# Patient Record
Sex: MALE | Race: OTHER | HISPANIC OR LATINO | Employment: FULL TIME | URBAN - METROPOLITAN AREA
[De-identification: names, ages, dates, MRNs, and addresses within clinical notes are randomized per-mention and may not be internally consistent; named-entity substitution may affect disease eponyms.]

---

## 2019-06-11 ENCOUNTER — HOSPITAL ENCOUNTER (EMERGENCY)
Facility: HOSPITAL | Age: 54
Discharge: HOME | End: 2019-06-11
Attending: EMERGENCY MEDICINE | Admitting: EMERGENCY MEDICINE
Payer: COMMERCIAL

## 2019-06-11 VITALS
WEIGHT: 179.23 LBS | RESPIRATION RATE: 14 BRPM | DIASTOLIC BLOOD PRESSURE: 84 MMHG | HEART RATE: 68 BPM | TEMPERATURE: 98.7 F | SYSTOLIC BLOOD PRESSURE: 135 MMHG | OXYGEN SATURATION: 97 %

## 2019-06-11 DIAGNOSIS — R10.13 EPIGASTRIC ABDOMINAL PAIN: Primary | ICD-10-CM

## 2019-06-11 LAB
ALBUMIN SERPL BCP-MCNC: 3.5 G/DL (ref 3.5–5)
ALP SERPL-CCNC: 65 U/L (ref 46–116)
ALT SERPL W P-5'-P-CCNC: 28 U/L (ref 12–78)
ANION GAP SERPL CALCULATED.3IONS-SCNC: 2 MMOL/L (ref 4–13)
AST SERPL W P-5'-P-CCNC: 18 U/L (ref 5–45)
ATRIAL RATE: 70 BPM
BASOPHILS # BLD AUTO: 0.05 THOUSANDS/ΜL (ref 0–0.1)
BASOPHILS NFR BLD AUTO: 1 % (ref 0–1)
BILIRUB SERPL-MCNC: 0.52 MG/DL (ref 0.2–1)
BUN SERPL-MCNC: 20 MG/DL (ref 5–25)
CALCIUM SERPL-MCNC: 8.6 MG/DL (ref 8.3–10.1)
CHLORIDE SERPL-SCNC: 106 MMOL/L (ref 100–108)
CO2 SERPL-SCNC: 28 MMOL/L (ref 21–32)
CREAT SERPL-MCNC: 1.2 MG/DL (ref 0.6–1.3)
EOSINOPHIL # BLD AUTO: 0.17 THOUSAND/ΜL (ref 0–0.61)
EOSINOPHIL NFR BLD AUTO: 3 % (ref 0–6)
ERYTHROCYTE [DISTWIDTH] IN BLOOD BY AUTOMATED COUNT: 12.4 % (ref 11.6–15.1)
GFR SERPL CREATININE-BSD FRML MDRD: 69 ML/MIN/1.73SQ M
GLUCOSE SERPL-MCNC: 93 MG/DL (ref 65–140)
HCT VFR BLD AUTO: 44.7 % (ref 36.5–49.3)
HGB BLD-MCNC: 15.3 G/DL (ref 12–17)
IMM GRANULOCYTES # BLD AUTO: 0.02 THOUSAND/UL (ref 0–0.2)
IMM GRANULOCYTES NFR BLD AUTO: 0 % (ref 0–2)
LIPASE SERPL-CCNC: 229 U/L (ref 73–393)
LYMPHOCYTES # BLD AUTO: 1.55 THOUSANDS/ΜL (ref 0.6–4.47)
LYMPHOCYTES NFR BLD AUTO: 25 % (ref 14–44)
MCH RBC QN AUTO: 32.6 PG (ref 26.8–34.3)
MCHC RBC AUTO-ENTMCNC: 34.2 G/DL (ref 31.4–37.4)
MCV RBC AUTO: 95 FL (ref 82–98)
MONOCYTES # BLD AUTO: 0.69 THOUSAND/ΜL (ref 0.17–1.22)
MONOCYTES NFR BLD AUTO: 11 % (ref 4–12)
NEUTROPHILS # BLD AUTO: 3.79 THOUSANDS/ΜL (ref 1.85–7.62)
NEUTS SEG NFR BLD AUTO: 60 % (ref 43–75)
NRBC BLD AUTO-RTO: 0 /100 WBCS
P AXIS: 70 DEGREES
PLATELET # BLD AUTO: 203 THOUSANDS/UL (ref 149–390)
PMV BLD AUTO: 10.6 FL (ref 8.9–12.7)
POTASSIUM SERPL-SCNC: 4.7 MMOL/L (ref 3.5–5.3)
PR INTERVAL: 126 MS
PROT SERPL-MCNC: 7.3 G/DL (ref 6.4–8.2)
QRS AXIS: 36 DEGREES
QRSD INTERVAL: 94 MS
QT INTERVAL: 378 MS
QTC INTERVAL: 408 MS
RBC # BLD AUTO: 4.7 MILLION/UL (ref 3.88–5.62)
SODIUM SERPL-SCNC: 136 MMOL/L (ref 136–145)
T WAVE AXIS: 49 DEGREES
TROPONIN I SERPL-MCNC: <0.02 NG/ML
VENTRICULAR RATE: 70 BPM
WBC # BLD AUTO: 6.27 THOUSAND/UL (ref 4.31–10.16)

## 2019-06-11 PROCEDURE — 84484 ASSAY OF TROPONIN QUANT: CPT | Performed by: EMERGENCY MEDICINE

## 2019-06-11 PROCEDURE — 99283 EMERGENCY DEPT VISIT LOW MDM: CPT | Performed by: EMERGENCY MEDICINE

## 2019-06-11 PROCEDURE — 80053 COMPREHEN METABOLIC PANEL: CPT | Performed by: EMERGENCY MEDICINE

## 2019-06-11 PROCEDURE — 93005 ELECTROCARDIOGRAM TRACING: CPT

## 2019-06-11 PROCEDURE — 93010 ELECTROCARDIOGRAM REPORT: CPT | Performed by: INTERNAL MEDICINE

## 2019-06-11 PROCEDURE — 83690 ASSAY OF LIPASE: CPT | Performed by: EMERGENCY MEDICINE

## 2019-06-11 PROCEDURE — 85025 COMPLETE CBC W/AUTO DIFF WBC: CPT | Performed by: EMERGENCY MEDICINE

## 2019-06-11 PROCEDURE — 36415 COLL VENOUS BLD VENIPUNCTURE: CPT | Performed by: EMERGENCY MEDICINE

## 2019-06-11 PROCEDURE — 99284 EMERGENCY DEPT VISIT MOD MDM: CPT

## 2019-06-11 RX ORDER — LIDOCAINE HYDROCHLORIDE 20 MG/ML
15 SOLUTION OROPHARYNGEAL ONCE
Status: COMPLETED | OUTPATIENT
Start: 2019-06-11 | End: 2019-06-11

## 2019-06-11 RX ORDER — MAGNESIUM HYDROXIDE/ALUMINUM HYDROXICE/SIMETHICONE 120; 1200; 1200 MG/30ML; MG/30ML; MG/30ML
30 SUSPENSION ORAL ONCE
Status: COMPLETED | OUTPATIENT
Start: 2019-06-11 | End: 2019-06-11

## 2019-06-11 RX ORDER — PANTOPRAZOLE SODIUM 40 MG/1
40 TABLET, DELAYED RELEASE ORAL
Status: DISCONTINUED | OUTPATIENT
Start: 2019-06-11 | End: 2019-06-11 | Stop reason: HOSPADM

## 2019-06-11 RX ORDER — OMEPRAZOLE 20 MG/1
20 CAPSULE, DELAYED RELEASE ORAL DAILY
Qty: 60 CAPSULE | Refills: 0 | Status: SHIPPED | OUTPATIENT
Start: 2019-06-11

## 2019-06-11 RX ADMIN — PANTOPRAZOLE SODIUM 40 MG: 40 TABLET, DELAYED RELEASE ORAL at 09:08

## 2019-06-11 RX ADMIN — LIDOCAINE HYDROCHLORIDE 15 ML: 20 SOLUTION ORAL; TOPICAL at 09:09

## 2019-06-11 RX ADMIN — ALUMINUM HYDROXIDE, MAGNESIUM HYDROXIDE, AND SIMETHICONE 30 ML: 200; 200; 20 SUSPENSION ORAL at 09:09

## 2019-10-07 ENCOUNTER — HOSPITAL ENCOUNTER (EMERGENCY)
Facility: HOSPITAL | Age: 54
Discharge: HOME | End: 2019-10-07
Attending: EMERGENCY MEDICINE | Admitting: EMERGENCY MEDICINE

## 2019-10-07 VITALS
DIASTOLIC BLOOD PRESSURE: 84 MMHG | HEART RATE: 84 BPM | OXYGEN SATURATION: 98 % | WEIGHT: 176 LBS | SYSTOLIC BLOOD PRESSURE: 144 MMHG | RESPIRATION RATE: 17 BRPM | TEMPERATURE: 97.9 F

## 2019-10-07 DIAGNOSIS — M95.8 ACQUIRED ABDOMINAL WALL DEFECT: Primary | ICD-10-CM

## 2019-10-07 LAB
ALBUMIN SERPL BCP-MCNC: 3.7 G/DL (ref 3.5–5)
ALP SERPL-CCNC: 66 U/L (ref 46–116)
ALT SERPL W P-5'-P-CCNC: 35 U/L (ref 12–78)
ANION GAP SERPL CALCULATED.3IONS-SCNC: 5 MMOL/L (ref 4–13)
AST SERPL W P-5'-P-CCNC: 25 U/L (ref 5–45)
ATRIAL RATE: 89 BPM
BASOPHILS # BLD AUTO: 0.06 THOUSANDS/ΜL (ref 0–0.1)
BASOPHILS NFR BLD AUTO: 1 % (ref 0–1)
BILIRUB SERPL-MCNC: 0.67 MG/DL (ref 0.2–1)
BUN SERPL-MCNC: 21 MG/DL (ref 5–25)
CALCIUM SERPL-MCNC: 9.2 MG/DL (ref 8.3–10.1)
CHLORIDE SERPL-SCNC: 105 MMOL/L (ref 100–108)
CO2 SERPL-SCNC: 26 MMOL/L (ref 21–32)
CREAT SERPL-MCNC: 1.3 MG/DL (ref 0.6–1.3)
EOSINOPHIL # BLD AUTO: 0.23 THOUSAND/ΜL (ref 0–0.61)
EOSINOPHIL NFR BLD AUTO: 3 % (ref 0–6)
ERYTHROCYTE [DISTWIDTH] IN BLOOD BY AUTOMATED COUNT: 12.7 % (ref 11.6–15.1)
GFR SERPL CREATININE-BSD FRML MDRD: 63 ML/MIN/1.73SQ M
GLUCOSE SERPL-MCNC: 106 MG/DL (ref 65–140)
HCT VFR BLD AUTO: 47.1 % (ref 36.5–49.3)
HGB BLD-MCNC: 16.3 G/DL (ref 12–17)
IMM GRANULOCYTES # BLD AUTO: 0.01 THOUSAND/UL (ref 0–0.2)
IMM GRANULOCYTES NFR BLD AUTO: 0 % (ref 0–2)
LYMPHOCYTES # BLD AUTO: 2.01 THOUSANDS/ΜL (ref 0.6–4.47)
LYMPHOCYTES NFR BLD AUTO: 29 % (ref 14–44)
MCH RBC QN AUTO: 31.8 PG (ref 26.8–34.3)
MCHC RBC AUTO-ENTMCNC: 34.6 G/DL (ref 31.4–37.4)
MCV RBC AUTO: 92 FL (ref 82–98)
MONOCYTES # BLD AUTO: 0.62 THOUSAND/ΜL (ref 0.17–1.22)
MONOCYTES NFR BLD AUTO: 9 % (ref 4–12)
NEUTROPHILS # BLD AUTO: 3.9 THOUSANDS/ΜL (ref 1.85–7.62)
NEUTS SEG NFR BLD AUTO: 58 % (ref 43–75)
NRBC BLD AUTO-RTO: 0 /100 WBCS
P AXIS: 68 DEGREES
PLATELET # BLD AUTO: 227 THOUSANDS/UL (ref 149–390)
PMV BLD AUTO: 10.4 FL (ref 8.9–12.7)
POTASSIUM SERPL-SCNC: 3.5 MMOL/L (ref 3.5–5.3)
PR INTERVAL: 134 MS
PROT SERPL-MCNC: 8.1 G/DL (ref 6.4–8.2)
QRS AXIS: 30 DEGREES
QRSD INTERVAL: 94 MS
QT INTERVAL: 358 MS
QTC INTERVAL: 435 MS
RBC # BLD AUTO: 5.13 MILLION/UL (ref 3.88–5.62)
SODIUM SERPL-SCNC: 136 MMOL/L (ref 136–145)
T WAVE AXIS: 85 DEGREES
TROPONIN I SERPL-MCNC: 0.02 NG/ML
VENTRICULAR RATE: 89 BPM
WBC # BLD AUTO: 6.83 THOUSAND/UL (ref 4.31–10.16)

## 2019-10-07 PROCEDURE — 99285 EMERGENCY DEPT VISIT HI MDM: CPT

## 2019-10-07 PROCEDURE — 36415 COLL VENOUS BLD VENIPUNCTURE: CPT | Performed by: EMERGENCY MEDICINE

## 2019-10-07 PROCEDURE — 93010 ELECTROCARDIOGRAM REPORT: CPT | Performed by: INTERNAL MEDICINE

## 2019-10-07 PROCEDURE — 93005 ELECTROCARDIOGRAM TRACING: CPT

## 2019-10-07 PROCEDURE — 84484 ASSAY OF TROPONIN QUANT: CPT | Performed by: EMERGENCY MEDICINE

## 2019-10-07 PROCEDURE — 85025 COMPLETE CBC W/AUTO DIFF WBC: CPT | Performed by: EMERGENCY MEDICINE

## 2019-10-07 PROCEDURE — 80053 COMPREHEN METABOLIC PANEL: CPT | Performed by: EMERGENCY MEDICINE

## 2019-10-07 PROCEDURE — 99284 EMERGENCY DEPT VISIT MOD MDM: CPT | Performed by: EMERGENCY MEDICINE

## 2019-10-07 RX ORDER — IBUPROFEN 600 MG/1
600 TABLET ORAL ONCE
Status: COMPLETED | OUTPATIENT
Start: 2019-10-07 | End: 2019-10-07

## 2019-10-07 RX ADMIN — IBUPROFEN 600 MG: 600 TABLET, FILM COATED ORAL at 07:35

## 2019-10-07 NOTE — ED PROVIDER NOTES
History  Chief Complaint   Patient presents with    Chest Pain     pt reports chest pain for several months, c/o it comes on when he eats or lifts something heavy     Bernarda Martinez is a 47y o  year old Male with PMH of GERD presenting to the [facility] ED for abdominal pain  Patient states three months of daily abdominal pain  Pain is localized to left upper quadrant  Patient notes he feels a small "click" in the area and the pain is reproduced with palpations  The pain is worse after meals and with heavy lifting  Does not radiate to the back  Patient has not had prior abdominal surgery  Taking omeprazole without relief  Denies N/V/D and no bloody stools or dark, tarry stools  Patient denies chest pain, dyspnea, lightheadedness  History provided by:  Patient   used: No    Abdominal Pain   Pain location:  LUQ  Pain quality: sharp    Pain radiates to:  Does not radiate  Pain severity:  Moderate  Onset quality:  Gradual  Duration:  12 weeks  Timing:  Constant  Progression:  Waxing and waning  Chronicity:  Recurrent  Context: eating    Context: not diet changes and not previous surgeries    Relieved by: Antacids  Associated symptoms: no anorexia, no chest pain, no chills, no constipation, no cough, no diarrhea, no dysuria, no fatigue, no fever, no hematochezia, no hematuria, no melena, no nausea, no shortness of breath and no vomiting    Risk factors: has not had multiple surgeries and no NSAID use        Prior to Admission Medications   Prescriptions Last Dose Informant Patient Reported? Taking?   omeprazole (PriLOSEC) 20 mg delayed release capsule 10/7/2019 at 0600  No Yes   Sig: Take 1 capsule (20 mg total) by mouth daily      Facility-Administered Medications: None       No past medical history on file  No past surgical history on file  No family history on file  I have reviewed and agree with the history as documented      Social History     Tobacco Use    Smoking status: Current Every Day Smoker     Types: Cigarettes    Smokeless tobacco: Never Used   Substance Use Topics    Alcohol use: Yes    Drug use: Never        Review of Systems   Constitutional: Negative for chills, fatigue and fever  HENT: Negative for congestion, nosebleeds and rhinorrhea  Eyes: Negative for visual disturbance  Respiratory: Negative for cough, chest tightness, shortness of breath and wheezing  Cardiovascular: Negative for chest pain, palpitations and leg swelling  Gastrointestinal: Positive for abdominal pain  Negative for abdominal distention, anal bleeding, anorexia, blood in stool, constipation, diarrhea, hematochezia, melena, nausea and vomiting  Endocrine: Negative for polyuria  Genitourinary: Negative for dysuria and hematuria  Musculoskeletal: Negative for arthralgias, gait problem and joint swelling  Skin: Negative for rash  Neurological: Negative for seizures, syncope, weakness, light-headedness and headaches  Hematological: Does not bruise/bleed easily  Psychiatric/Behavioral: Negative for behavioral problems and confusion  All other systems reviewed and are negative  Physical Exam  ED Triage Vitals   Temperature Pulse Respirations Blood Pressure SpO2   10/07/19 0706 10/07/19 0701 10/07/19 0701 10/07/19 0701 10/07/19 0701   97 9 °F (36 6 °C) 92 20 163/93 98 %      Temp Source Heart Rate Source Patient Position - Orthostatic VS BP Location FiO2 (%)   10/07/19 0706 10/07/19 0701 10/07/19 0701 10/07/19 0701 --   Oral Monitor Sitting Right arm       Pain Score       10/07/19 0701       8             Orthostatic Vital Signs  Vitals:    10/07/19 0701 10/07/19 0800   BP: 163/93 144/84   Pulse: 92 84   Patient Position - Orthostatic VS: Sitting Sitting       Physical Exam   Constitutional: He is oriented to person, place, and time  He appears well-developed and well-nourished  HENT:   Head: Normocephalic and atraumatic     Cardiovascular: Normal rate and regular rhythm  No murmur heard  Pulmonary/Chest: Effort normal and breath sounds normal  No respiratory distress  He has no wheezes  He has no rales  Abdominal: Soft  Bowel sounds are normal  He exhibits no distension  There is tenderness (Left upper quadrant, 6cm below left nipple  )  There is no rigidity, no rebound, no guarding, no CVA tenderness, no tenderness at McBurney's point and negative Nathan's sign  Focal abdominal wall defect with reproducible tenderness and reducible ventral hernia left upper quadrant  Overlying skin is not warm, erythematous   Neurological: He is alert and oriented to person, place, and time  Skin: Skin is warm  Capillary refill takes less than 2 seconds  Psychiatric: He has a normal mood and affect  Nursing note and vitals reviewed        ED Medications  Medications   ibuprofen (MOTRIN) tablet 600 mg (600 mg Oral Given 10/7/19 0735)       Diagnostic Studies  Results Reviewed     Procedure Component Value Units Date/Time    Comprehensive metabolic panel [[id number]] Collected:  10/07/19 0705    Lab Status:  Final result Specimen:  Blood from Arm, Right Updated:  10/07/19 0738     Sodium 136 mmol/L      Potassium 3 5 mmol/L      Chloride 105 mmol/L      CO2 26 mmol/L      ANION GAP 5 mmol/L      BUN 21 mg/dL      Creatinine 1 30 mg/dL      Glucose 106 mg/dL      Calcium 9 2 mg/dL      AST 25 U/L      ALT 35 U/L      Alkaline Phosphatase 66 U/L      Total Protein 8 1 g/dL      Albumin 3 7 g/dL      Total Bilirubin 0 67 mg/dL      eGFR 63 ml/min/1 73sq m     Narrative:       [place] guidelines for Chronic Kidney Disease (CKD):     Stage 1 with normal or high GFR (GFR > 90 mL/min/1 73 square meters)    Stage 2 Mild CKD (GFR = 60-89 mL/min/1 73 square meters)    Stage 3A Moderate CKD (GFR = 45-59 mL/min/1 73 square meters)    Stage 3B Moderate CKD (GFR = 30-44 mL/min/1 73 square meters)    Stage 4 Severe CKD (GFR = 15-29 mL/min/1 73 square meters)    Stage 5 End Stage CKD (GFR <15 mL/min/1 73 square meters)  Note: GFR calculation is accurate only with a steady state creatinine    Troponin I [[id number]]  (Normal) Collected:  10/07/19 0705    Lab Status:  Final result Specimen:  Blood from Arm, Right Updated:  10/07/19 0737     Troponin I 0 02 ng/mL     CBC and differential [[id number]] Collected:  10/07/19 0705    Lab Status:  Final result Specimen:  Blood from Arm, Right Updated:  10/07/19 0729     WBC 6 83 Thousand/uL      RBC 5 13 Million/uL      Hemoglobin 16 3 g/dL      Hematocrit 47 1 %      MCV 92 fL      MCH 31 8 pg      MCHC 34 6 g/dL      RDW 12 7 %      MPV 10 4 fL      Platelets 835 Thousands/uL      nRBC 0 /100 WBCs      Neutrophils Relative 58 %      Immat GRANS % 0 %      Lymphocytes Relative 29 %      Monocytes Relative 9 %      Eosinophils Relative 3 %      Basophils Relative 1 %      Neutrophils Absolute 3 90 Thousands/µL      Immature Grans Absolute 0 01 Thousand/uL      Lymphocytes Absolute 2 01 Thousands/µL      Monocytes Absolute 0 62 Thousand/µL      Eosinophils Absolute 0 23 Thousand/µL      Basophils Absolute 0 06 Thousands/µL                  CT abdomen pelvis w contrast    (Results Pending)         Procedures  Procedures        ED Course  ED Course as of Oct 07 1849   Mon Oct 07, 2019   0270 Procedure Note: EKG  Date/Time: 10/07/19 7:52 AM   Interpreted by: Jessica Sandy DO  Indications / Diagnosis: Epigastric pain  ECG reviewed by me, the ED Provider: yes   The EKG demonstrates:  Rhythm: Normal sinus @ 89  Intervals: Normal SD and QT intervals  Axis: Normal axis  QRS/Blocks: Normal QRS  ST Changes: No acute ST Changes, no STD/ANDI                                   MDM  Number of Diagnoses or Management Options  Acquired abdominal wall defect:   Diagnosis management comments: 47 y o  Male presenting for abdominal pain  Labs, including LFTs and bilirubin WNL  Cardiac w/u negative  Tolerating PO    Symptoms and exam consistent with abdominal wall hernia  Will give info for outpatient CT scan and encourage follow up with general surgery for management  RTED precautions given including vomiting, persistent abdominal pain, nonreducible hernia  I have discussed with the patient our plan to discharge them from the ED and the patient is in agreement with this plan  I have educated the patient on pertinent lab/imaging results and answered their questions  Return to the ED precautions given  I have also discussed with the patient plans for follow up with General Surgery  Amount and/or Complexity of Data Reviewed  Clinical lab tests: ordered and reviewed    Patient Progress  Patient progress: stable      Disposition  Final diagnoses:   Acquired abdominal wall defect     Time reflects when diagnosis was documented in both MDM as applicable and the Disposition within this note     Time User Action Codes Description Comment    10/7/2019  7:41 AM Abbey Payan Add [M95 8] Acquired abdominal wall defect       ED Disposition     ED Disposition Condition Date/Time Comment    Discharge Stable Mon Oct 7, 2019  7:40 AM Roxana Manzo Colon discharge to home/self care  Follow-up Information     Follow up With Specialties Details Why Contact Info Additional Information    [facility] Scheduling Schedule an appointment as soon as possible for a visit  To schedule CT scan of abdomen [address] [place] [address]  1020 [facility], [address], [address]   [phone]    700 [facility] Schedule an appointment as soon as possible for a visit  To make appointment for reevaluation in 5-7 days   [facility]77 [facility] system 540 [facility], [address], 2313 [facility] Schedule an appointment as soon as possible for a visit  To establish care with a PCP  [phone]             Discharge Medication List as of 10/7/2019  8:05 AM      CONTINUE these medications which have NOT CHANGED    Details   omeprazole (PriLOSEC) 20 mg delayed release capsule Take 1 capsule (20 mg total) by mouth daily, Starting Tue 6/11/2019, Print           Outpatient Discharge Orders   CT abdomen pelvis w contrast   Standing Status: Future Standing Exp  Date: 10/07/23       ED Provider  Attending physically available and evaluated Mercedes Marrow  I managed the patient along with the ED Attending      Electronically Signed by Charisse Stuart 162, DO  10/07/19 5411

## 2019-10-07 NOTE — DISCHARGE INSTRUCTIONS
You have been seen for abdominal pain  Please complete a CT scan outpatient by calling the central scheduling number provided  Please follow up with general surgery after completing your CT scan  You can take ibuprofen for Return to the ED for worsening abdominal pain, nausea/vomiting, blood in stool

## 2019-10-07 NOTE — ED ATTENDING ATTESTATION
10/7/2019  Ramirez Meza DO saw and evaluated the patient  I have discussed the patient with the resident/non-physician practitioner and agree with the resident's/non-physician practitioner's findings, Plan of Care, and MDM as documented in the resident's/non-physician practitioner's note, except where noted  All available labs and Radiology studies were reviewed  I was present for key portions of any procedure(s) performed by the resident/non-physician practitioner and I was immediately available to provide assistance  At this point I agree with the current assessment done in the Emergency Department  I have conducted an independent evaluation of this patient a history and physical is as follows:    48 yo male c/o LUQ abd pain, feels an abd wall defect that is worse when he lifts things or sits up  Has been ongoing for many months  Pain rated 8/10 at its worst  Denies f/c/n/v/d, CP/SOB, cough  abd snd mod TTP LUQ, small area of defect probable  No r/g bs+    Imp: abd pain, possible small hernia plan: basic labs already ordered  No evidence of incarcerated hernia at this time  Recommend outpt imaging and f/u surgery        ED Course         Critical Care Time  Procedures

## 2020-02-17 ENCOUNTER — OFFICE VISIT (OUTPATIENT)
Dept: URGENT CARE | Age: 55
End: 2020-02-17
Payer: COMMERCIAL

## 2020-02-17 VITALS
SYSTOLIC BLOOD PRESSURE: 130 MMHG | HEART RATE: 92 BPM | OXYGEN SATURATION: 99 % | DIASTOLIC BLOOD PRESSURE: 65 MMHG | RESPIRATION RATE: 18 BRPM | TEMPERATURE: 98.6 F

## 2020-02-17 DIAGNOSIS — R10.11 RUQ PAIN: Primary | ICD-10-CM

## 2020-02-17 DIAGNOSIS — R05.9 COUGH: ICD-10-CM

## 2020-02-17 DIAGNOSIS — R51.9 ACUTE NONINTRACTABLE HEADACHE, UNSPECIFIED HEADACHE TYPE: ICD-10-CM

## 2020-02-17 DIAGNOSIS — R10.32 LEFT LOWER QUADRANT ABDOMINAL PAIN: ICD-10-CM

## 2020-02-17 DIAGNOSIS — R50.9 FEVER, UNSPECIFIED FEVER CAUSE: ICD-10-CM

## 2020-02-17 PROCEDURE — 99213 OFFICE O/P EST LOW 20 MIN: CPT | Performed by: PHYSICIAN ASSISTANT

## 2020-02-17 NOTE — PATIENT INSTRUCTIONS
Patient would like to go via private vehicle to [facility] Emergency Department  Please go to the [facility] Emergency Department now for further evaluation and treatment- hospital address verified with the patient  Patient agreed to go immediately to the ED

## 2021-11-18 ENCOUNTER — HOSPITAL ENCOUNTER (EMERGENCY)
Facility: HOSPITAL | Age: 56
Discharge: HOME | End: 2021-11-18
Attending: EMERGENCY MEDICINE | Admitting: EMERGENCY MEDICINE
Payer: COMMERCIAL

## 2021-11-18 VITALS
OXYGEN SATURATION: 98 % | WEIGHT: 190 LBS | RESPIRATION RATE: 18 BRPM | HEART RATE: 99 BPM | DIASTOLIC BLOOD PRESSURE: 87 MMHG | SYSTOLIC BLOOD PRESSURE: 159 MMHG | TEMPERATURE: 98.8 F

## 2021-11-18 DIAGNOSIS — S29.012A STRAIN OF THORACIC BACK REGION: Primary | ICD-10-CM

## 2021-11-18 DIAGNOSIS — M25.511 RIGHT SHOULDER PAIN: ICD-10-CM

## 2021-11-18 PROCEDURE — 99284 EMERGENCY DEPT VISIT MOD MDM: CPT | Performed by: EMERGENCY MEDICINE

## 2021-11-18 PROCEDURE — 99283 EMERGENCY DEPT VISIT LOW MDM: CPT

## 2021-11-18 PROCEDURE — 96372 THER/PROPH/DIAG INJ SC/IM: CPT

## 2021-11-18 RX ORDER — KETOROLAC TROMETHAMINE 30 MG/ML
15 INJECTION, SOLUTION INTRAMUSCULAR; INTRAVENOUS ONCE
Status: COMPLETED | OUTPATIENT
Start: 2021-11-18 | End: 2021-11-18

## 2021-11-18 RX ORDER — NAPROXEN 500 MG/1
500 TABLET ORAL 2 TIMES DAILY WITH MEALS
Qty: 14 TABLET | Refills: 0 | Status: SHIPPED | OUTPATIENT
Start: 2021-11-18 | End: 2021-11-25

## 2021-11-18 RX ORDER — METHOCARBAMOL 500 MG/1
500 TABLET, FILM COATED ORAL 2 TIMES DAILY
Qty: 14 TABLET | Refills: 0 | Status: SHIPPED | OUTPATIENT
Start: 2021-11-18 | End: 2021-11-25

## 2021-11-18 RX ADMIN — KETOROLAC TROMETHAMINE 15 MG: 30 INJECTION, SOLUTION INTRAMUSCULAR at 16:02

## 2022-10-27 ENCOUNTER — OFFICE VISIT (OUTPATIENT)
Dept: INTERNAL MEDICINE CLINIC | Facility: CLINIC | Age: 57
End: 2022-10-27

## 2022-10-27 VITALS
TEMPERATURE: 98.4 F | BODY MASS INDEX: 30.22 KG/M2 | HEART RATE: 103 BPM | OXYGEN SATURATION: 96 % | SYSTOLIC BLOOD PRESSURE: 128 MMHG | WEIGHT: 199.4 LBS | DIASTOLIC BLOOD PRESSURE: 86 MMHG | HEIGHT: 68 IN

## 2022-10-27 DIAGNOSIS — M79.10 MYALGIA: ICD-10-CM

## 2022-10-27 DIAGNOSIS — F32.2 CURRENT SEVERE EPISODE OF MAJOR DEPRESSIVE DISORDER WITHOUT PSYCHOTIC FEATURES WITHOUT PRIOR EPISODE (HCC): ICD-10-CM

## 2022-10-27 DIAGNOSIS — K21.00 GASTROESOPHAGEAL REFLUX DISEASE WITH ESOPHAGITIS WITHOUT HEMORRHAGE: ICD-10-CM

## 2022-10-27 DIAGNOSIS — M62.838 MUSCLE SPASMS OF BOTH LOWER EXTREMITIES: ICD-10-CM

## 2022-10-27 DIAGNOSIS — R25.2 MUSCLE CRAMPING: ICD-10-CM

## 2022-10-27 DIAGNOSIS — M25.511 RIGHT SHOULDER PAIN, UNSPECIFIED CHRONICITY: ICD-10-CM

## 2022-10-27 DIAGNOSIS — G89.29 CHRONIC RIGHT SHOULDER PAIN: ICD-10-CM

## 2022-10-27 DIAGNOSIS — M25.511 CHRONIC RIGHT SHOULDER PAIN: ICD-10-CM

## 2022-10-27 DIAGNOSIS — Z12.11 ENCOUNTER FOR SCREENING FOR MALIGNANT NEOPLASM OF COLON: Primary | ICD-10-CM

## 2022-10-27 LAB — HBA1C MFR BLD HPLC: 5.9 %

## 2022-10-27 RX ORDER — FLUOXETINE HYDROCHLORIDE 20 MG/1
20 CAPSULE ORAL DAILY
COMMUNITY
Start: 2022-09-27 | End: 2022-11-26

## 2022-10-27 RX ORDER — CYCLOBENZAPRINE HCL 10 MG
10 TABLET ORAL 3 TIMES DAILY
Qty: 45 TABLET | Refills: 0 | Status: SHIPPED | OUTPATIENT
Start: 2022-10-27 | End: 2022-11-11

## 2022-10-27 RX ORDER — PREDNISONE 20 MG/1
40 TABLET ORAL DAILY
Qty: 10 TABLET | Refills: 0 | Status: SHIPPED | OUTPATIENT
Start: 2022-10-27 | End: 2022-11-01

## 2022-10-27 RX ORDER — PANTOPRAZOLE SODIUM 40 MG/1
40 TABLET, DELAYED RELEASE ORAL DAILY
Qty: 30 TABLET | Refills: 5 | Status: SHIPPED | OUTPATIENT
Start: 2022-10-27

## 2022-10-27 NOTE — PROGRESS NOTES
Depression Screening Follow-up Plan: Patient's depression screening was positive with a PHQ-2 score of 6  Their PHQ-9 score was 22   {Depression screen follow-up plan:[id number]}

## 2022-10-27 NOTE — PROGRESS NOTES
Name: Jaiden Robert      : 1965      MRN: [id number]  Encounter Provider: David Frye MD  Encounter Date: 10/27/2022   Encounter department: [address] [place] [address]  Encounter for screening for malignant neoplasm of colon  Assessment & Plan:  Screening colonoscopy requested    Orders:  -     Ambulatory referral for colonoscopy; Future  -     CBC and differential; Future  -     C-reactive protein; Future  -     CK (with reflex to MB); Future  -     ОЛЕГ Screen w/ Reflex to Titer/Pattern; Future  -     Hemoglobin A1C; Future  -     RPR; Future  -     Magnesium; Future  -     RF Screen w/ Reflex to Titer; Future  -     Cyclic citrul peptide antibody, IgG; Future  -     Comprehensive metabolic panel; Future    2  Myalgia  Assessment & Plan:  Diffuse myalgias for the past year  Will obtain labs and then initiate a brief course of prednisone to observe a response    Orders:  -     Ambulatory referral for colonoscopy; Future  -     CBC and differential; Future  -     C-reactive protein; Future  -     CK (with reflex to MB); Future  -     ОЛЕГ Screen w/ Reflex to Titer/Pattern; Future  -     Hemoglobin A1C; Future  -     RPR; Future  -     Magnesium; Future  -     RF Screen w/ Reflex to Titer; Future  -     Cyclic citrul peptide antibody, IgG; Future  -     Comprehensive metabolic panel; Future  -     cyclobenzaprine (FLEXERIL) 10 mg tablet; Take 1 tablet (10 mg total) by mouth 3 (three) times a day for 15 days  -     predniSONE 20 mg tablet; Take 2 tablets (40 mg total) by mouth daily for 5 days    3  Muscle spasms of both lower extremities  Assessment & Plan:  Diagnostic studies requested  We will consider EMG studies in the future  For now patient was provided with a prescription for cyclobenzaprine 10 mg every 8 hours  Orders:  -     Ambulatory referral for colonoscopy;  Future  -     CBC and differential; Future  -     C-reactive protein; Future  -     CK (with reflex to MB); Future  -     ОЛЕГ Screen w/ Reflex to Titer/Pattern; Future  -     Hemoglobin A1C; Future  -     RPR; Future  -     Magnesium; Future  -     RF Screen w/ Reflex to Titer; Future  -     Cyclic citrul peptide antibody, IgG; Future  -     Comprehensive metabolic panel; Future  -     cyclobenzaprine (FLEXERIL) 10 mg tablet; Take 1 tablet (10 mg total) by mouth 3 (three) times a day for 15 days  -     predniSONE 20 mg tablet; Take 2 tablets (40 mg total) by mouth daily for 5 days    4  Right shoulder pain, unspecified chronicity  Assessment & Plan:  Possible right rotator cuff injury  Will await results of current lab testing before consideration of MRI  May need a course of physical therapy initially    Orders:  -     Ambulatory referral for colonoscopy; Future  -     CBC and differential; Future  -     C-reactive protein; Future  -     CK (with reflex to MB); Future  -     ОЛЕГ Screen w/ Reflex to Titer/Pattern; Future  -     Hemoglobin A1C; Future  -     RPR; Future  -     Magnesium; Future  -     RF Screen w/ Reflex to Titer; Future  -     Cyclic citrul peptide antibody, IgG; Future  -     Comprehensive metabolic panel; Future  -     cyclobenzaprine (FLEXERIL) 10 mg tablet; Take 1 tablet (10 mg total) by mouth 3 (three) times a day for 15 days    5  Current severe episode of major depressive disorder without psychotic features without prior episode [facility])  Assessment & Plan:  Most likely consequent to the fact that he has not been able to work because of his current symptoms and because of the severity of symptoms  6  Gastroesophageal reflux disease with esophagitis without hemorrhage  Assessment & Plan:  Symptoms not well controlled with omeprazole  Had been better controlled previously with pantoprazole  Will change medications    Orders:  -     pantoprazole (PROTONIX) 40 mg tablet; Take 1 tablet (40 mg total) by mouth daily    7   Chronic right shoulder pain  Assessment & Plan:  Possible right rotator cuff injury  Will await results of current lab testing before consideration of MRI  May need a course of physical therapy initially      8  Muscle cramping  Assessment & Plan:  Primarily the upper extremities and hands but he does experience cramping of his toes as well as specially if he wears sandals           Subjective      43-year-old gentleman presents to the office to establish primary care  His wife is a patient of this practice  He has been dealing with some chronic muscle pain in his hands and feet which has been going on for months  He complains of some back pain and some right shoulder pain following an accident on his job  Shortly after his accident he was let go by his employer where he worked as a   Because of his pain in his hands and his muscles he has been unable to work in his usual profession  His shoulder has never gotten better and he is unable to raise his shoulder above the level of his shoulder  He has limited range of motion due to pain  He has never had any imaging of his shoulder other than x-rays  He has had no therapy  He also complains of a painful lump in the middle of his chest which appears to be his xiphoid process  Review of Systems   Constitutional: Positive for activity change (Limited physical activity due to muscle spasms and myalgias)  Negative for appetite change, chills, diaphoresis, fatigue, fever and unexpected weight change  HENT: Negative  Eyes: Negative  Respiratory: Negative  Cardiovascular: Positive for chest pain (Xiphoid pain)  Gastrointestinal: Negative  Endocrine: Negative  Genitourinary: Negative  Musculoskeletal: Positive for arthralgias (Right shoulder), back pain (Intermittent low back pain) and myalgias (Diffuse upper lower extremities and muscle spasms of the hands and fingers)  Negative for neck pain and neck stiffness  Skin: Negative  Allergic/Immunologic: Negative  Neurological: Positive for tremors (Tremors and spasms of the hands and feet)  Hematological: Negative  Psychiatric/Behavioral: The patient is nervous/anxious  Current Outpatient Medications on File Prior to Visit   Medication Sig   • FLUoxetine (PROzac) 20 mg capsule Take 20 mg by mouth daily   • naproxen (Naprosyn) 500 mg tablet Take 1 tablet (500 mg total) by mouth 2 (two) times a day with meals for 7 days   • [DISCONTINUED] methocarbamol (ROBAXIN) 500 mg tablet Take 1 tablet (500 mg total) by mouth 2 (two) times a day for 7 days   • [DISCONTINUED] omeprazole (PriLOSEC) 20 mg delayed release capsule Take 1 capsule (20 mg total) by mouth daily   • [DISCONTINUED] Omeprazole (PRILOSEC PO) Take 20 mg by mouth daily     Depression Screening Follow-up Plan: Patient's depression screening was positive with a PHQ-2 score of 6  Their PHQ-9 score was 22  Patient's depressive symptoms likely due to other medical condition  Would recommend treatment of underlying condition  Will continue to monitor at next office visit  Will continue antidepressant medication until evaluation is complete      Objective     /86 (BP Location: Left arm, Patient Position: Sitting, Cuff Size: Standard)   Pulse 103   Temp 98 4 °F (36 9 °C) (Tympanic)   Ht 5' 8 31" (1 735 m)   Wt 90 4 kg (199 lb 6 4 oz)   SpO2 96%   BMI 30 05 kg/m²     Physical Exam  Vitals reviewed  Constitutional:       General: He is not in acute distress  Appearance: Normal appearance  He is not ill-appearing, toxic-appearing or diaphoretic  HENT:      Head: Normocephalic and atraumatic  Right Ear: Tympanic membrane, ear canal and external ear normal  There is no impacted cerumen  Left Ear: Tympanic membrane, ear canal and external ear normal  There is no impacted cerumen  Nose: Nose normal       Mouth/Throat:      Mouth: Mucous membranes are moist       Pharynx: No oropharyngeal exudate     Eyes:      General: No scleral icterus  Conjunctiva/sclera: Conjunctivae normal       Pupils: Pupils are equal, round, and reactive to light  Neck:      Vascular: No carotid bruit  Cardiovascular:      Rate and Rhythm: Regular rhythm  Tachycardia present  Pulses: Normal pulses  Heart sounds: Normal heart sounds  No murmur heard  Pulmonary:      Effort: Pulmonary effort is normal  No respiratory distress  Breath sounds: Normal breath sounds  No wheezing, rhonchi or rales  Chest:      Chest wall: Tenderness (Overlying the xiphoid bone) present  Abdominal:      General: Abdomen is flat  Bowel sounds are normal  There is no distension  Palpations: Abdomen is soft  Tenderness: There is no abdominal tenderness  Musculoskeletal:         General: Tenderness (Right shoulder) present  No swelling or deformity  Cervical back: Neck supple  Tenderness (Of the posterior cervical musculature) present  Right lower leg: No edema  Left lower leg: No edema  Lymphadenopathy:      Cervical: No cervical adenopathy  Skin:     General: Skin is warm  Coloration: Skin is not jaundiced  Findings: No bruising, erythema or rash  Neurological:      General: No focal deficit present  Mental Status: He is alert and oriented to person, place, and time  Mental status is at baseline  Comments: Cogwheeling rigidity noted of both upper extremities  There is no parkinsonian facies    His gait is normal muscle spasm was noted of the lateral left hand overlying the 5th metacarpal       Karuna Esparza MD

## 2022-10-27 NOTE — ASSESSMENT & PLAN NOTE
Symptoms not well controlled with omeprazole  Had been better controlled previously with pantoprazole    Will change medications

## 2022-10-27 NOTE — ASSESSMENT & PLAN NOTE
Primarily the upper extremities and hands but he does experience cramping of his toes as well as specially if he wears sandals

## 2022-10-27 NOTE — ASSESSMENT & PLAN NOTE
Possible right rotator cuff injury  Will await results of current lab testing before consideration of MRI    May need a course of physical therapy initially

## 2022-10-27 NOTE — ASSESSMENT & PLAN NOTE
Most likely consequent to the fact that he has not been able to work because of his current symptoms and because of the severity of symptoms

## 2022-10-27 NOTE — ASSESSMENT & PLAN NOTE
Diagnostic studies requested  We will consider EMG studies in the future  For now patient was provided with a prescription for cyclobenzaprine 10 mg every 8 hours

## 2022-10-27 NOTE — ASSESSMENT & PLAN NOTE
Diffuse myalgias for the past year    Will obtain labs and then initiate a brief course of prednisone to observe a response

## 2022-11-11 ENCOUNTER — OFFICE VISIT (OUTPATIENT)
Dept: INTERNAL MEDICINE CLINIC | Facility: CLINIC | Age: 57
End: 2022-11-11

## 2022-11-11 ENCOUNTER — APPOINTMENT (OUTPATIENT)
Dept: LAB | Facility: CLINIC | Age: 57
End: 2022-11-11

## 2022-11-11 VITALS
TEMPERATURE: 98.1 F | BODY MASS INDEX: 30.16 KG/M2 | DIASTOLIC BLOOD PRESSURE: 86 MMHG | HEART RATE: 105 BPM | HEIGHT: 69 IN | SYSTOLIC BLOOD PRESSURE: 126 MMHG | OXYGEN SATURATION: 95 % | WEIGHT: 203.6 LBS

## 2022-11-11 DIAGNOSIS — M79.10 MYALGIA: ICD-10-CM

## 2022-11-11 DIAGNOSIS — M62.838 MUSCLE SPASMS OF BOTH LOWER EXTREMITIES: Primary | ICD-10-CM

## 2022-11-11 DIAGNOSIS — M62.838 MUSCLE SPASMS OF BOTH LOWER EXTREMITIES: ICD-10-CM

## 2022-11-11 DIAGNOSIS — Z86.79 HX OF RHEUMATIC FEVER: ICD-10-CM

## 2022-11-11 DIAGNOSIS — R25.2 MUSCLE CRAMPING: ICD-10-CM

## 2022-11-11 LAB — ERYTHROCYTE [SEDIMENTATION RATE] IN BLOOD: 25 MM/HOUR (ref 0–19)

## 2022-11-11 RX ORDER — PREGABALIN 50 MG/1
50 CAPSULE ORAL 3 TIMES DAILY
Qty: 30 CAPSULE | Refills: 1 | Status: SHIPPED | OUTPATIENT
Start: 2022-11-11

## 2022-11-11 NOTE — PROGRESS NOTES
Name: Libertad Sweet      : 1965      MRN: [id number]  Encounter Provider: Ladan Monroe MD  Encounter Date: 2022   Encounter department: [address] [place] [address]     1  Muscle spasms of both lower extremities  -     Acetylcholine Receptor (AChR) Binding Antibodies Complete Panel With Reflex to MuSK Antibodies; Future  -     Acetylcholine Receptor (AChR) Binding Antibodies with Reflex to MuSK Antibodies; Future  -     Sedimentation rate, automated; Future  -     pregabalin (Lyrica) 50 mg capsule; Take 1 capsule (50 mg total) by mouth 3 (three) times a day  -     Ambulatory Referral to Rheumatology; Future    2  Myalgia  Assessment & Plan:  Myalgia and weakness  Will check acetylcholine receptor antibodies    Orders:  -     Acetylcholine Receptor (AChR) Binding Antibodies Complete Panel With Reflex to MuSK Antibodies; Future  -     Acetylcholine Receptor (AChR) Binding Antibodies with Reflex to MuSK Antibodies; Future  -     Sedimentation rate, automated; Future  -     pregabalin (Lyrica) 50 mg capsule; Take 1 capsule (50 mg total) by mouth 3 (three) times a day  -     Ambulatory Referral to Rheumatology; Future  -     EMG 1 Limb; Future    3  Hx of rheumatic fever  -     Ambulatory Referral to Rheumatology; Future  -     EMG 1 Limb; Future    4  Muscle cramping  -     EMG 1 Limb; Future           Subjective      Patient presents for follow-up visit  Patient continues to experience muscle aches, muscle weakness and muscle tremors and fasciculations of his right upper extremity  Also experiences spasms of his toes were patient states his toes fan out in a spasm until they relax  He was given a trial of prednisone which did very little for his muscle aches  Patient states it helped him to sleep but that is all  Cyclobenzaprine was ineffective    Lab studies were significant for positive rheumatoid factor, a hemoglobin A1c of 5 9%, magnesium level of 2 4, all other studies were within normal limits  Review of Systems   Constitutional: Negative  HENT: Negative  Eyes: Negative  Respiratory: Negative  Cardiovascular: Negative  Gastrointestinal: Negative  Genitourinary: Negative  Musculoskeletal: Positive for myalgias  Skin: Negative  Allergic/Immunologic: Negative  Neurological: Positive for tremors (And muscle fasciculations)  Hematological: Negative  Psychiatric/Behavioral: Negative  Current Outpatient Medications on File Prior to Visit   Medication Sig   • cyclobenzaprine (FLEXERIL) 10 mg tablet Take 1 tablet (10 mg total) by mouth 3 (three) times a day for 15 days   • FLUoxetine (PROzac) 20 mg capsule Take 20 mg by mouth daily   • pantoprazole (PROTONIX) 40 mg tablet Take 1 tablet (40 mg total) by mouth daily   • naproxen (Naprosyn) 500 mg tablet Take 1 tablet (500 mg total) by mouth 2 (two) times a day with meals for 7 days (Patient not taking: Reported on 11/11/2022)       Objective     /86 (BP Location: Left arm, Patient Position: Sitting, Cuff Size: Large)   Pulse 105   Temp 98 1 °F (36 7 °C) (Tympanic)   Ht 5' 8 9" (1 75 m)   Wt 92 4 kg (203 lb 9 6 oz)   SpO2 95%   BMI 30 16 kg/m²     Physical Exam  Vitals reviewed  Constitutional:       General: He is not in acute distress  Appearance: Normal appearance  He is not ill-appearing, toxic-appearing or diaphoretic  HENT:      Head: Normocephalic and atraumatic  Right Ear: External ear normal       Left Ear: External ear normal    Eyes:      General: No scleral icterus  Conjunctiva/sclera: Conjunctivae normal       Pupils: Pupils are equal, round, and reactive to light  Cardiovascular:      Rate and Rhythm: Normal rate and regular rhythm  Pulses: Normal pulses  Heart sounds: Normal heart sounds  No murmur heard  Pulmonary:      Effort: Pulmonary effort is normal  No respiratory distress        Breath sounds: Normal breath sounds  Abdominal:      General: Abdomen is flat  Bowel sounds are normal  There is no distension  Tenderness: There is no abdominal tenderness  Musculoskeletal:         General: No swelling, tenderness or deformity  Cervical back: Neck supple  No rigidity  Right lower leg: No edema  Left lower leg: No edema  Lymphadenopathy:      Cervical: No cervical adenopathy  Skin:     General: Skin is warm  Capillary Refill: Capillary refill takes less than 2 seconds  Coloration: Skin is not jaundiced  Findings: No bruising or erythema  Neurological:      General: No focal deficit present  Mental Status: He is alert and oriented to person, place, and time  Mental status is at baseline        Comments: Fasciculations noted in the right upper arm biceps muscle   Psychiatric:         Mood and Affect: Mood normal          Behavior: Behavior normal        Unruly Vasquez MD

## 2022-11-15 ENCOUNTER — TELEPHONE (OUTPATIENT)
Dept: INTERNAL MEDICINE CLINIC | Facility: CLINIC | Age: 57
End: 2022-11-15

## 2022-11-15 NOTE — TELEPHONE ENCOUNTER
Patient was seen on 11/11/22 and you wanted him to follow back up in 2 weeks, where In your schedule can I place patient for the week of 11/28/22

## 2022-11-28 LAB — MISCELLANEOUS LAB TEST RESULT: NORMAL

## 2022-12-01 ENCOUNTER — OFFICE VISIT (OUTPATIENT)
Dept: INTERNAL MEDICINE CLINIC | Facility: CLINIC | Age: 57
End: 2022-12-01

## 2022-12-01 VITALS
BODY MASS INDEX: 30.07 KG/M2 | HEIGHT: 69 IN | HEART RATE: 113 BPM | SYSTOLIC BLOOD PRESSURE: 116 MMHG | TEMPERATURE: 98 F | DIASTOLIC BLOOD PRESSURE: 80 MMHG | WEIGHT: 203 LBS | OXYGEN SATURATION: 98 %

## 2022-12-01 DIAGNOSIS — R25.2 MUSCLE CRAMPING: ICD-10-CM

## 2022-12-01 DIAGNOSIS — F32.2 CURRENT SEVERE EPISODE OF MAJOR DEPRESSIVE DISORDER WITHOUT PSYCHOTIC FEATURES WITHOUT PRIOR EPISODE (HCC): ICD-10-CM

## 2022-12-01 DIAGNOSIS — M25.511 CHRONIC RIGHT SHOULDER PAIN: Primary | ICD-10-CM

## 2022-12-01 DIAGNOSIS — G89.29 CHRONIC RIGHT SHOULDER PAIN: Primary | ICD-10-CM

## 2022-12-01 DIAGNOSIS — K21.00 GASTROESOPHAGEAL REFLUX DISEASE WITH ESOPHAGITIS WITHOUT HEMORRHAGE: ICD-10-CM

## 2022-12-01 RX ORDER — DOXEPIN HYDROCHLORIDE 25 MG/1
25 CAPSULE ORAL
COMMUNITY
Start: 2022-11-18

## 2022-12-01 RX ORDER — PREDNISONE 20 MG/1
20 TABLET ORAL DAILY
COMMUNITY

## 2022-12-01 RX ORDER — PRAMIPEXOLE DIHYDROCHLORIDE 0.5 MG/1
0.5 TABLET ORAL 3 TIMES DAILY
Qty: 90 TABLET | Refills: 1 | Status: SHIPPED | OUTPATIENT
Start: 2022-12-01

## 2022-12-01 RX ORDER — DULOXETIN HYDROCHLORIDE 30 MG/1
30 CAPSULE, DELAYED RELEASE ORAL DAILY
COMMUNITY
Start: 2022-11-18

## 2022-12-01 NOTE — PROGRESS NOTES
Name: Evin Vance      : 1965      MRN: [id number]  Encounter Provider: Lizet Dunbar MD  Encounter Date: 2022   Encounter department: [address] [place] [address]     1  Chronic right shoulder pain  Assessment & Plan:  And associated muscle spasm  EMG is scheduled for the   To date lab testing has been unremarkable  Rheumatoid factor was mildly positive  Sed rate was minimally elevated at 25; all other lab studies are normal      2  Gastroesophageal reflux disease with esophagitis without hemorrhage  Assessment & Plan:  Continues to experience some intermittent epigastric discomfort  Pantoprazole has been helpful but not completely      3  Current severe episode of major depressive disorder without psychotic features without prior episode [facility])  Assessment & Plan:  Latuda 40 mg daily was recently added to his regimen      4  Muscle cramping  Assessment & Plan:  Flexeril has been minimally helpful  We will discontinue Flexeril and place the patient on a trial of Mirapex 0 5 mg t i d     Orders:  -     pramipexole (MIRAPEX) 0 5 mg tablet; Take 1 tablet (0 5 mg total) by mouth 3 (three) times a day         Subjective      Presents for follow-up visit  He is still complaining of intermittent infrequent muscle cramping involving the right side of his upper body, primarily right upper extremity and chest   Medications to date have not been very effective  Flexeril has allowed him to sleep a little bit better but he still experiences significant muscle cramping at night  Acetyl choline receptor antibodies studies were all negative  Sed rate was only mildly elevated 25  Rheumatoid factor was slightly positive but all other testing has been negative  Patient is scheduled for an EMG of his right upper extremity on   He was recently started on Latuda  40 mg daily     Patient states he has not noticed much of an effect either way  Review of Systems   Constitutional: Negative  HENT: Negative  Eyes: Negative  Respiratory: Negative  Cardiovascular: Negative  Gastrointestinal: Negative  Endocrine: Negative  Genitourinary: Negative  Musculoskeletal: Positive for myalgias (Right-sided arm and chest wall muscle spasms)  Skin: Negative  Allergic/Immunologic: Negative  Neurological: Negative  Hematological: Negative  Psychiatric/Behavioral: Negative  Current Outpatient Medications on File Prior to Visit   Medication Sig   • doxepin (SINEquan) 25 mg capsule Take 25 mg by mouth daily at bedtime   • DULoxetine (CYMBALTA) 30 mg delayed release capsule Take 30 mg by mouth daily   • FLUoxetine (PROzac) 20 mg capsule Take 20 mg by mouth daily   • lurasidone (Latuda) 40 mg tablet Take 20 mg by mouth daily with breakfast   • pantoprazole (PROTONIX) 40 mg tablet Take 1 tablet (40 mg total) by mouth daily   • predniSONE 20 mg tablet Take 20 mg by mouth daily   • pregabalin (Lyrica) 50 mg capsule Take 1 capsule (50 mg total) by mouth 3 (three) times a day   • [DISCONTINUED] cyclobenzaprine (FLEXERIL) 10 mg tablet Take 1 tablet (10 mg total) by mouth 3 (three) times a day for 15 days   • naproxen (Naprosyn) 500 mg tablet Take 1 tablet (500 mg total) by mouth 2 (two) times a day with meals for 7 days (Patient not taking: Reported on 12/1/2022)       Objective     /80 (BP Location: Left arm, Patient Position: Sitting, Cuff Size: Standard)   Pulse (!) 113   Temp 98 °F (36 7 °C) (Tympanic)   Ht 5' 9" (1 753 m)   Wt 92 1 kg (203 lb)   SpO2 98%   BMI 29 98 kg/m²     Physical Exam  Vitals reviewed  Constitutional:       General: He is not in acute distress  Appearance: Normal appearance  He is normal weight  He is not ill-appearing, toxic-appearing or diaphoretic  HENT:      Head: Normocephalic and atraumatic        Right Ear: External ear normal       Left Ear: External ear normal       Nose: Nose normal    Eyes:      General: No scleral icterus  Pupils: Pupils are equal, round, and reactive to light  Cardiovascular:      Rate and Rhythm: Regular rhythm  Tachycardia present  Pulses: Normal pulses  Heart sounds: Normal heart sounds  No murmur heard  Pulmonary:      Effort: Pulmonary effort is normal  No respiratory distress  Breath sounds: Normal breath sounds  Abdominal:      General: Abdomen is flat  There is no distension  Musculoskeletal:      Cervical back: Neck supple  No rigidity  Right lower leg: No edema  Left lower leg: No edema  Skin:     General: Skin is warm  Coloration: Skin is not jaundiced or pale  Findings: No bruising or erythema  Neurological:      General: No focal deficit present  Mental Status: He is alert and oriented to person, place, and time  Mental status is at baseline  Cranial Nerves: No cranial nerve deficit  Sensory: No sensory deficit  Motor: No weakness  Coordination: Coordination normal       Gait: Gait normal       Deep Tendon Reflexes: Reflexes abnormal (Diminished bilaterally)         Héctor العلي MD

## 2022-12-01 NOTE — ASSESSMENT & PLAN NOTE
Flexeril has been minimally helpful    We will discontinue Flexeril and place the patient on a trial of Mirapex 0 5 mg t i d

## 2022-12-01 NOTE — ASSESSMENT & PLAN NOTE
And associated muscle spasm  EMG is scheduled for the 15th of December  To date lab testing has been unremarkable  Rheumatoid factor was mildly positive    Sed rate was minimally elevated at 25; all other lab studies are normal

## 2022-12-01 NOTE — ASSESSMENT & PLAN NOTE
Continues to experience some intermittent epigastric discomfort    Pantoprazole has been helpful but not completely

## 2022-12-15 ENCOUNTER — HOSPITAL ENCOUNTER (OUTPATIENT)
Dept: NEUROLOGY | Facility: CLINIC | Age: 57
End: 2022-12-15

## 2022-12-15 DIAGNOSIS — R25.2 MUSCLE CRAMPING: ICD-10-CM

## 2022-12-15 DIAGNOSIS — Z86.79 HX OF RHEUMATIC FEVER: ICD-10-CM

## 2022-12-15 DIAGNOSIS — M79.10 MYALGIA: ICD-10-CM

## 2022-12-26 PROBLEM — Z12.11 ENCOUNTER FOR SCREENING FOR MALIGNANT NEOPLASM OF COLON: Status: RESOLVED | Noted: 2022-10-27 | Resolved: 2022-12-26

## 2022-12-27 ENCOUNTER — TELEPHONE (OUTPATIENT)
Dept: INTERNAL MEDICINE CLINIC | Age: 57
End: 2022-12-27

## 2022-12-30 ENCOUNTER — OFFICE VISIT (OUTPATIENT)
Dept: INTERNAL MEDICINE CLINIC | Facility: CLINIC | Age: 57
End: 2022-12-30

## 2022-12-30 VITALS
HEART RATE: 111 BPM | HEIGHT: 69 IN | TEMPERATURE: 99.1 F | OXYGEN SATURATION: 97 % | SYSTOLIC BLOOD PRESSURE: 110 MMHG | WEIGHT: 211 LBS | DIASTOLIC BLOOD PRESSURE: 80 MMHG | BODY MASS INDEX: 31.25 KG/M2

## 2022-12-30 DIAGNOSIS — G89.29 CHRONIC RIGHT SHOULDER PAIN: ICD-10-CM

## 2022-12-30 DIAGNOSIS — M25.511 PAIN, JOINT, SHOULDER REGION, RIGHT: Primary | ICD-10-CM

## 2022-12-30 DIAGNOSIS — M62.838 MUSCLE SPASMS OF BOTH LOWER EXTREMITIES: ICD-10-CM

## 2022-12-30 DIAGNOSIS — G56.01 RIGHT CARPAL TUNNEL SYNDROME: ICD-10-CM

## 2022-12-30 DIAGNOSIS — M25.511 CHRONIC RIGHT SHOULDER PAIN: ICD-10-CM

## 2022-12-30 DIAGNOSIS — R73.03 PREDIABETES: ICD-10-CM

## 2022-12-30 RX ORDER — HYDROCODONE BITARTRATE AND ACETAMINOPHEN 5; 325 MG/1; MG/1
1 TABLET ORAL EVERY 6 HOURS PRN
Qty: 60 TABLET | Refills: 0 | Status: SHIPPED | OUTPATIENT
Start: 2022-12-30

## 2022-12-30 NOTE — PROGRESS NOTES
Name: Brittanie Forde      : 1965      MRN: [id number]  Encounter Provider: Mariely Faustin MD  Encounter Date: 2022   Encounter department: [address] [place] [address]     1  Pain, joint, shoulder region, right  -     Ambulatory Referral to Physical Therapy; Future  -     HYDROcodone-acetaminophen (Norco) 5-325 mg per tablet; Take 1 tablet by mouth every 6 (six) hours as needed for pain Max Daily Amount: 4 tablets    2  Chronic right shoulder pain  Assessment & Plan:  Significant limitation in shoulder motion and raising arm above his shoulder  Suspect rotator cuff tendinopathy  Do not suspect tear  We will refer the patient to physical therapy and depending on his response he may need MRI scanning      3  Right carpal tunnel syndrome  Assessment & Plan:  Wrist splinting along with topical applications of diclofenac gel      4  Prediabetes  Assessment & Plan:  Hemoglobin A1c is 5 9%  No additional treatment is needed at this time      5  Muscle spasms of both lower extremities  Assessment & Plan:  Some improvement with Lyrica and pramipexole  We will add a low-dose of hydrocodone and see if this is effective  Subjective      Patient presents to the office for follow-up visit  He had EMG studies done did not show any evidence of radiculopathy as far as his right arm discomfort is concerned  It did show evidence of mild right carpal tunnel syndrome  He continues to experience severe right shoulder pain but he is sleeping better with the medications that have been initiated  He has not yet tried any type of physical therapy so we will refer to physical therapy before considering MRI imaging and defer that to reevaluation following physical therapy  Review of Systems   Constitutional: Negative  Negative for activity change, appetite change, chills, diaphoresis, fatigue, fever and unexpected weight change  HENT: Negative      Eyes: Negative  Respiratory: Negative  Cardiovascular: Negative  Gastrointestinal: Negative  Endocrine: Negative  Genitourinary: Negative  Musculoskeletal: Positive for arthralgias (Right shoulder), myalgias (Right shoulder, mid back) and neck pain  Skin: Negative  Neurological: Positive for numbness (Intermittently, the fingers of the right hand)  Hematological: Negative  Psychiatric/Behavioral: The patient is not nervous/anxious  Current Outpatient Medications on File Prior to Visit   Medication Sig   • doxepin (SINEquan) 25 mg capsule Take 25 mg by mouth daily at bedtime   • DULoxetine (CYMBALTA) 30 mg delayed release capsule Take 30 mg by mouth daily   • FLUoxetine (PROzac) 20 mg capsule Take 20 mg by mouth daily   • lurasidone (LATUDA) 40 mg tablet Take 20 mg by mouth daily with breakfast   • pantoprazole (PROTONIX) 40 mg tablet Take 1 tablet (40 mg total) by mouth daily   • pramipexole (MIRAPEX) 0 5 mg tablet Take 1 tablet (0 5 mg total) by mouth 3 (three) times a day   • pregabalin (Lyrica) 50 mg capsule Take 1 capsule (50 mg total) by mouth 3 (three) times a day   • [DISCONTINUED] naproxen (Naprosyn) 500 mg tablet Take 1 tablet (500 mg total) by mouth 2 (two) times a day with meals for 7 days (Patient not taking: Reported on 12/1/2022)   • [DISCONTINUED] predniSONE 20 mg tablet Take 20 mg by mouth daily (Patient not taking: Reported on 12/30/2022)       Objective     /80 (BP Location: Left arm, Patient Position: Sitting, Cuff Size: Large)   Pulse (!) 111   Temp 99 1 °F (37 3 °C) (Tympanic)   Ht 5' 9" (1 753 m)   Wt 95 7 kg (211 lb)   SpO2 97%   BMI 31 16 kg/m²     Physical Exam  Vitals reviewed  Constitutional:       General: He is not in acute distress  Appearance: Normal appearance  He is normal weight  He is not ill-appearing, toxic-appearing or diaphoretic  HENT:      Head: Normocephalic and atraumatic        Right Ear: External ear normal       Left Ear: External ear normal       Nose: Nose normal    Eyes:      General: No scleral icterus  Conjunctiva/sclera: Conjunctivae normal       Pupils: Pupils are equal, round, and reactive to light  Neck:      Vascular: No carotid bruit or JVD  Trachea: No tracheal deviation  Cardiovascular:      Rate and Rhythm: Normal rate and regular rhythm  Pulses: Normal pulses  Heart sounds: Normal heart sounds  No murmur heard  Pulmonary:      Effort: Pulmonary effort is normal  No respiratory distress  Breath sounds: Normal breath sounds  No rales  Abdominal:      General: Abdomen is flat  There is no distension  Musculoskeletal:         General: Swelling (Mild swelling of the right shoulder  No deformity) present  Cervical back: Neck supple  Right lower leg: No edema  Left lower leg: No edema  Skin:     General: Skin is warm  Coloration: Skin is not jaundiced  Findings: No bruising, erythema or rash  Neurological:      General: No focal deficit present  Mental Status: He is alert and oriented to person, place, and time  Mental status is at baseline     Psychiatric:         Mood and Affect: Mood normal          Behavior: Behavior normal        Yanira Cervantes MD

## 2022-12-30 NOTE — ASSESSMENT & PLAN NOTE
Some improvement with Lyrica and pramipexole  We will add a low-dose of hydrocodone and see if this is effective

## 2022-12-30 NOTE — ASSESSMENT & PLAN NOTE
Significant limitation in shoulder motion and raising arm above his shoulder  Suspect rotator cuff tendinopathy  Do not suspect tear    We will refer the patient to physical therapy and depending on his response he may need MRI scanning

## 2023-01-09 ENCOUNTER — EVALUATION (OUTPATIENT)
Dept: PHYSICAL THERAPY | Facility: REHABILITATION | Age: 58
End: 2023-01-09

## 2023-01-09 DIAGNOSIS — M25.511 PAIN, JOINT, SHOULDER REGION, RIGHT: ICD-10-CM

## 2023-01-09 DIAGNOSIS — M54.12 CERVICAL RADICULOPATHY: Primary | ICD-10-CM

## 2023-01-09 NOTE — PROGRESS NOTES
PT Evaluation     Today's date: 2023  Patient name: Christi Dang  : 1965  MRN: [id number]  Referring provider: Verner Hover*  Dx:   Encounter Diagnosis     ICD-10-CM    1  Cervical radiculopathy  M54 12 PT plan of care cert/re-cert      2  Pain, joint, shoulder region, right  M25 511 Ambulatory Referral to Physical Therapy     PT plan of care cert/re-cert          Start Time: 1115  Stop Time: 1205  Total time in clinic (min): 50 minutes    Assessment  Assessment details: Christi Dang is a 65 y o  male presenting with acute on chronic R shoulder and neck pain with high symptom irritability, with likely radicular component  Primary impairments include decreased ROM, weakness, pain, paresthesia, and motor control  Assessments limited and inconclusive due to level of irritability  Will benefit from skilled PT interventions for return to PLOF HEP was provided and reviewed  Patient is able to complete HEP with good technique and appropriate pain response  Patient expressed understanding of appropriate dosage and frequency of HEP  No additional referral necessary  Impairments: abnormal coordination, abnormal muscle firing, abnormal or restricted ROM, abnormal movement, activity intolerance, impaired balance, impaired physical strength, lacks appropriate home exercise program, pain with function, poor posture  and poor body mechanics  Functional limitations: OH activity, carrying, lifting,   Symptom irritability: moderateBarriers to therapy: Language, irritability   Understanding of Dx/Px/POC: good   Prognosis: good    Goals    Short Term Goals: In 4 weeks, the patient will:  1  Full pain free cervical ROM  2  SH elevation to >145 AROM  3  Supervision with HEP for self care    Long Term Goals: In 8 weeks, the patient will:  1  SH AROM WNL, MMT to 5/5 all planes  2  FOTO to greater than predicted value  3  Independent with HEP for selfcare      Plan  Plan details: 2-3/wk for 8 weeks  Patient would benefit from: skilled physical therapy  Planned therapy interventions: joint mobilization, manual therapy, massage, Castaneda taping, neuromuscular re-education, patient education, postural training, self care, strengthening, stretching, therapeutic activities, therapeutic exercise, therapeutic training, graded exercise, graded activity, home exercise program, flexibility, functional ROM exercises, balance and activity modification  Treatment plan discussed with: patient        Subjective  Pain Location: R shoulder neck and head  Pain Intensity: 10/10 right now, meds modify it  KWAKU: possibly struck with piece of metal working, started around a month later  DOI: ~1 year ago, worse in last 3 months  Occupation:   Provoked by: behind back reaching, AROM,   Eased Positions: medication, rest,   Constitutional S/S: none   Dominant Hand: R  Goals: return to PLOF    Objective    Vitals: bp 140/85  Red Flags: None    Standing         Head Position x Protracted  Neutral  Retracted   Scapular Position x Protracted  Neutral  Retracted   Thoracic Spine x Inc Kyphosis  Neutral       Strength and ROM evaluated B from a regional biomechanical perspective and values relevant to this episode recorded in table below    ROM: Goniometric measurement revealed the following findings  Shoulder A/PROM Right   Flexion 85/100   Abduction 90/95   ER WNL   IR WNl         Strength: MMT revealed the following findings  Joint Motion Right Left   Sh  Flexion 2+/5 5/5   Sh  Abduction 2+/5 5/5   Sh  ER 2+/5 5/5   Sh  IR 3/5 5/5   Shoulder extension 4/5 5/5     Additional Assessments:  Palpation: TTP upper trap, scalens, SCM,   Obersvation: Very emotional regarding impact of s/s of his ability to work, very hyperirritable to all assessment at this time   Likely centrally mediated component   Joint mobility: decreased R GHJ inferior glides    Shoulder Specific Special Tests:  Inconclusive secondary to hyperirritablity, will assess further and irritability reduces                  Range of Motion measurements for the Cervical Spine (in degrees)     Joint Motion Right:  Left:    Flexion 70    Extension 5*    Rotation 45* 75   Lateral Flexion 65* 55*     Neuro Screen:  Reflexes  Right Left   Biceps (C5-C6) 2+ 2+   Brachioradialis (C5-C6) 2+ 2+   Triceps (C7-C8) 2+ 2+   Patellar (L3-L4) 2+ 2+   Achilles (S1-S2) 2+ 2+   Melo's n n   Clonus n n     UE Myotomes:  Nerve Root Test Action RIGHT  LEFT    C3 Neck side flexion intact intact   C4 Shoulder elevation intact intact   C5 Shoulder abduction intact intact   C6 Elbow Flexion and wrist extension intact intact   C7 Elbow extension and wrist flexion intact intact   C8 Thumb extension and ulnar deviation intact intact   T1 Hand intrinsics intact intact     UE Dermatomes Notes: Intact, peresthesia into clowards points and distal UE intermittently     Palpation:Significant TTP globally to region, worse along upp    Directional Preference: retraction probable, but inconclusive due to irritability     Joint Mobility:decreased cervical mobility C5-7 closing on right, s/s reduced with distraction    Cervical Vascular Screenin-D's   Dizziness   Diploplia   Drop Attacks   Dysphagia   Dysarthria  3-N's   Nausea   Nystagmus    Numbness  The above were all Negative    Upper Cervical Ligament Testing:   Transverse ligament stress test   Alar Ligament stress test   Sharp-Kayce   *The above were all Negative    Cervical Radiculopathy Test Item Cluster Toi Zimmerman RS et al  Jennifer Alegria, 2003)  Any 3 above + = + LR of 6 1  Any 4 above + = + LR of 30 3  Test / Measure  2023   Upper Limb Tension Test A Unable to achieve postion   Spurling's A P   Distraction P   C-Rotation < 60 ipsilateral side P             Flowsheet Rows    Flowsheet Row Most Recent Value   PT/OT G-Codes    Current Score 29   Projected Score 53           Precautions: preDM, depression, anxiety,     Pertinent Findings: POC End Date: 3/9/23                                                                                                 Test / Measure  1/9/2023   FOTO 29    R (60 #L) 40#   R ROT  45deg   SH flexion AROM  85deg   SH mmt flex/abd/er 2+/5       Manuals 1/9/23    C/S distraction 8'    G5 nv              Neuro Re-Ed     C/S RET 5"x15    Shrugs 2x10    GHJ Isometrics 5"x10 ea                        Ther Ex     HEP Review/PNE 8'                                       Ther Activity               Gait Training               Modalities

## 2023-01-11 ENCOUNTER — OFFICE VISIT (OUTPATIENT)
Dept: PHYSICAL THERAPY | Facility: REHABILITATION | Age: 58
End: 2023-01-11

## 2023-01-11 DIAGNOSIS — M25.511 PAIN, JOINT, SHOULDER REGION, RIGHT: Primary | ICD-10-CM

## 2023-01-11 DIAGNOSIS — M54.12 CERVICAL RADICULOPATHY: ICD-10-CM

## 2023-01-11 NOTE — PROGRESS NOTES
Daily Note     Today's date: 2023  Patient name: Robyn Aparicio  : 1965  MRN: [id number]  Referring provider: Montse Mcallister*  Dx:   Encounter Diagnosis     ICD-10-CM    1  Pain, joint, shoulder region, right  M25 511       2  Cervical radiculopathy  M54 12           Start Time: 1000  Stop Time: 1039  Total time in clinic (min): 39 minutes    Subjective: Pt reports no significant changes in status since initial evaluation  States medication provides some relief  Objective: See treatment diary below      Assessment: Tolerated treatment fair  Patient would benefit from continued PT  Pt able to tolerate slight progression of POC this tx session  R Sh ROM remains limited  Strength is pain limited  Radicular symptoms persist   1:1 with Jose Jose DPT entirety of tx  Plan: Progress treatment as tolerated         Precautions: preDM, depression, anxiety,     Pertinent Findings:                                                    POC End Date: 3/9/23                                                                                                 Test / Measure  2023   FOTO 29    R (60 #L) 40#   R ROT  45deg   SH flexion AROM  85deg   SH mmt flex/abd/er 2+/5       Manuals 23   C/S distr, SOR, STM, R UT S 8' MM 8'   G5 nv              Neuro Re-Ed     C/S RET 5"x15 5"x15 supine   Shrugs 2x10 2x10   GHJ Isometrics 5"x10 ea    Scapt retr  2x10                  Ther Ex     HEP Review/PNE 8'    Pulleys  5'   UBE  3'   Supine Sh flex AAROM  2x10   TB rows  2x10 btb   TB Sh ext  2x10 btb                  Ther Activity               Gait Training               Modalities

## 2023-01-13 ENCOUNTER — OFFICE VISIT (OUTPATIENT)
Dept: PHYSICAL THERAPY | Facility: REHABILITATION | Age: 58
End: 2023-01-13

## 2023-01-13 DIAGNOSIS — M54.12 CERVICAL RADICULOPATHY: Primary | ICD-10-CM

## 2023-01-13 DIAGNOSIS — M25.511 PAIN, JOINT, SHOULDER REGION, RIGHT: ICD-10-CM

## 2023-01-13 RX ORDER — HYDROCODONE BITARTRATE AND ACETAMINOPHEN 5; 325 MG/1; MG/1
1 TABLET ORAL EVERY 6 HOURS PRN
Qty: 60 TABLET | Refills: 0 | Status: SHIPPED | OUTPATIENT
Start: 2023-01-13

## 2023-01-13 NOTE — PROGRESS NOTES
Daily Note     Today's date: 2023  Patient name: Genia Garrido  : 1965  MRN: [id number]  Referring provider: Greogry Hawkins*  Dx:   Encounter Diagnosis     ICD-10-CM    1  Cervical radiculopathy  M54 12       2  Pain, joint, shoulder region, right  M25 511           Start Time: 0900  Stop Time: 0943  Total time in clinic (min): 43 minutes    Subjective: Minimal change in s/s today  Objective: See treatment diary below      Assessment: Tolerated treatment well  Patient would benefit from continued PT 1:1 with CHIRAG MarianoT for entirety of tx  Pain dominant and BETSY limitations noted  Gradual exposure to positional tolerance reduced inhibition  Continue per POC  Plan: Continue per plan of care        Precautions: preDM, depression, anxiety,     Pertinent Findings:                                                    POC End Date: 3/9/23                                                                                                 Test / Measure  2023   FOTO 29    R (60 #L) 40#   R ROT  45deg   SH flexion AROM  85deg   SH mmt flex/abd/er 2+/5       Manuals 23   C/S distr, SOR, STM, R UT S 8' MM 8' KS 8'   G5 nv                 Neuro Re-Ed      C/S RET 5"x15 5"x15 supine 5"x15 supine   Shrugs 2x10 2x10 2x10   GHJ Isometrics 5"x10 ea     Scapt retr  2x10 2x10                     Ther Ex      HEP Review/PNE 8'     Pulleys  5' 6'   UBE  3'    Supine Sh flex AAROM  2x10 2x10   TB rows  2x10 btb 2x10 mtb   TB Sh ext  2x10 btb 2x10 btb                     Ther Activity                  Gait Training                  Modalities

## 2023-01-13 NOTE — TELEPHONE ENCOUNTER
rx refill for HYDROcodone-acetaminophen (Norco) 5-325 mg per tablet      Send to [address] [place] [address], [address] Yulissa Morganas     lov- 12/30/22 Nov- 2/1/23

## 2023-01-16 ENCOUNTER — TELEPHONE (OUTPATIENT)
Dept: INTERNAL MEDICINE CLINIC | Facility: CLINIC | Age: 58
End: 2023-01-16

## 2023-01-16 NOTE — TELEPHONE ENCOUNTER
Prior Authorization started for rx HYDROcodone-acetaminophen (Norco) 5-325 mg per tablet  Scanned into patient's chart, given to MA

## 2023-01-18 ENCOUNTER — OFFICE VISIT (OUTPATIENT)
Dept: PHYSICAL THERAPY | Facility: REHABILITATION | Age: 58
End: 2023-01-18

## 2023-01-18 DIAGNOSIS — M25.511 PAIN, JOINT, SHOULDER REGION, RIGHT: Primary | ICD-10-CM

## 2023-01-18 DIAGNOSIS — M54.12 CERVICAL RADICULOPATHY: ICD-10-CM

## 2023-01-18 NOTE — PROGRESS NOTES
Daily Note     Today's date: 2023  Patient name: Geoff Briones  : 1965  MRN: [id number]  Referring provider: Tory Peck*  Dx:   Encounter Diagnosis     ICD-10-CM    1  Pain, joint, shoulder region, right  M25 511       2  Cervical radiculopathy  M54 12                      Subjective: Pt  reports no change in status upon arrival   Pt states he continues to have shoulder pain mainly on R side  Objective: See treatment diary below      Assessment: Tolerated treatment fair  Patient would benefit from continued PT  Pt limited by pain throughout session, has difficulty with cervical rotation to R and sidebending to L, limited with cervical flexion and extension  Pt tender to palpation with SOR in R UT  Pt would benefit from continued PT to address current deficits and restore PLOF, improve QOL  Pt  1:1 with PTA for entirety  Plan: Continue per plan of care        Precautions: preDM, depression, anxiety,     Pertinent Findings:                                                    POC End Date: 3/9/23                                                                                                 Test / Measure  2023   FOTO 29    R (60 #L) 40#   R ROT  45deg   SH flexion AROM  85deg   SH mmt flex/abd/er 2+/5       Manuals 23   C/S distr, SOR, STM, R UT S 8' MM 8' KS 8' KP 10'   G5 nv                    Neuro Re-Ed       C/S RET 5"x15 5"x15 supine 5"x15 supine 15x5" supine   Shrugs 2x10 2x10 2x10 2x10   GHJ Isometrics 5"x10 ea      Scapt retr  2x10 2x10 2x10                        Ther Ex       HEP Review/PNE 8'      Pulleys  5' 6' 6'   UBE  3'     Supine Sh flex AAROM  2x10 2x10 2x10   TB rows  2x10 btb 2x10 mtb 2x10 mtb   TB Sh ext  2x10 btb 2x10 btb 2x10 btb   BL TB ER    2x10 ytb                 Ther Activity                     Gait Training                     Modalities

## 2023-01-20 ENCOUNTER — OFFICE VISIT (OUTPATIENT)
Dept: PHYSICAL THERAPY | Facility: REHABILITATION | Age: 58
End: 2023-01-20

## 2023-01-20 DIAGNOSIS — M25.511 PAIN, JOINT, SHOULDER REGION, RIGHT: Primary | ICD-10-CM

## 2023-01-20 DIAGNOSIS — M54.12 CERVICAL RADICULOPATHY: ICD-10-CM

## 2023-01-20 NOTE — PROGRESS NOTES
Daily Note     Today's date: 2023  Patient name: Baldev Nye  : 1965  MRN: [id number]  Referring provider: Efraín Torrez*  Dx:   Encounter Diagnosis     ICD-10-CM    1  Pain, joint, shoulder region, right  M25 511       2  Cervical radiculopathy  M54 12           Start Time: 1000  Stop Time: 1043  Total time in clinic (min): 43 minutes    Subjective: Mild improvement in s/s  Objective: See treatment diary below      Assessment: Tolerated treatment fair  Patient would benefit from continued PT 1:1 with Pratik Webber DPT for entirety of tx  Pt progressing, well, reports crepitus in R shoulder during TB ER which reduced with scap cueing  Plan: Continue per plan of care        Precautions: preDM, depression, anxiety,     Pertinent Findings:                                                    POC End Date: 3/9/23                                                                                                 Test / Measure  2023   FOTO 29    R (60 #L) 40#   R ROT  45deg   SH flexion AROM  85deg   SH mmt flex/abd/er 2+/5       Manuals 23   C/S distr, SOR, STM, R UT S 8' MM 8' KS 8' KP 10' KS 8'   G5 nv                       Neuro Re-Ed        C/S RET 5"x15 5"x15 supine 5"x15 supine 15x5" supine    Shrugs 2x10 2x10 2x10 2x10 3x10    GHJ Isometrics 5"x10 ea       Scapt retr  2x10 2x10 2x10 2x10   Up strap S     4x30"                   Ther Ex        HEP Review/PNE 8'       Pulleys  5' 6' 6' 6'   UBE  3'      Supine Sh flex AAROM  2x10 2x10 2x10 2x10   TB rows  2x10 btb 2x10 mtb 2x10 mtb 2x10 mtb   TB Sh ext  2x10 btb 2x10 btb 2x10 btb 2x10 btb   BL TB ER    2x10 ytb UL 3x10 rtb                   Ther Activity                        Gait Training                        Modalities

## 2023-01-25 ENCOUNTER — APPOINTMENT (OUTPATIENT)
Dept: PHYSICAL THERAPY | Facility: REHABILITATION | Age: 58
End: 2023-01-25

## 2023-01-27 ENCOUNTER — OFFICE VISIT (OUTPATIENT)
Dept: PHYSICAL THERAPY | Facility: REHABILITATION | Age: 58
End: 2023-01-27

## 2023-01-27 DIAGNOSIS — M54.12 CERVICAL RADICULOPATHY: ICD-10-CM

## 2023-01-27 DIAGNOSIS — M25.511 PAIN, JOINT, SHOULDER REGION, RIGHT: Primary | ICD-10-CM

## 2023-01-27 NOTE — PROGRESS NOTES
Daily Note     Today's date: 2023  Patient name: Carmel Mccann  : 1965  MRN: [id number]  Referring provider: Mcarthur Aschoff*  Dx:   Encounter Diagnosis     ICD-10-CM    1  Pain, joint, shoulder region, right  M25 511       2  Cervical radiculopathy  M54 12           Start Time: 1025  Stop Time: 1115  Total time in clinic (min): 50 minutes    Subjective: Reports minimal change in symptoms  Objective: See treatment diary below    Assessment: Tolerated treatment well  Patient would benefit from continued PT 1:1 with Acacia Hansen DPT for entirety of tx  Pt reports no change in s/s but displays less observable signs of pain and distress throughout tx session  Demonstrates improved flexion AROM and cervical AROM  Continue strengthening, motor control training, and graded exposure per POC  Plan: Continue per plan of care        Precautions: preDM, depression, anxiety,     Pertinent Findings:                                                    POC End Date: 3/9/23                                                                                                 Test / Measure  2023   FOTO 29 44    R (60 #L) 40#    R ROT  45deg 60deg   SH flexion AROM  85deg 130deg   SH mmt flex/abd/er 2+/5 3/5       Manuals 23   C/S distr, SOR, STM, R UT S 8' MM 8' KS 8' KP 10' KS 8' KS 8' +SH PROM   G5 nv                          Neuro Re-Ed         C/S RET 5"x15 5"x15 supine 5"x15 supine 15x5" supine  15x5" supine   Shrugs 2x10 2x10 2x10 2x10 3x10  3x10    GHJ Isometrics 5"x10 ea        Scapt retr  2x10 2x10 2x10 2x10 2x10   Up strap S     4x30" 4x30"                     Ther Ex         HEP Review/PNE 8'        Pulleys  5' 6' 6' 6'    UBE  3'    3/3   Supine Sh flex AAROM  2x10 2x10 2x10 2x10 2x10   TB rows  2x10 btb 2x10 mtb 2x10 mtb 2x10 mtb 2x10 mtb   TB Sh ext  2x10 btb 2x10 btb 2x10 btb 2x10 btb 2x10 btb   BL TB ER    2x10 ytb UL 3x10 rtb UL 3x10 rtb   Wall slides      2x10   SH Flexion      2x10   Ther Activity                           Gait Training                           Modalities

## 2023-02-01 ENCOUNTER — OFFICE VISIT (OUTPATIENT)
Dept: INTERNAL MEDICINE CLINIC | Facility: CLINIC | Age: 58
End: 2023-02-01

## 2023-02-01 VITALS
TEMPERATURE: 98.1 F | OXYGEN SATURATION: 97 % | WEIGHT: 210 LBS | HEIGHT: 70 IN | HEART RATE: 116 BPM | DIASTOLIC BLOOD PRESSURE: 84 MMHG | SYSTOLIC BLOOD PRESSURE: 122 MMHG | BODY MASS INDEX: 30.06 KG/M2

## 2023-02-01 DIAGNOSIS — G89.29 CHRONIC RIGHT SHOULDER PAIN: ICD-10-CM

## 2023-02-01 DIAGNOSIS — M62.838 MUSCLE SPASMS OF BOTH LOWER EXTREMITIES: Primary | ICD-10-CM

## 2023-02-01 DIAGNOSIS — R00.0 SINUS TACHYCARDIA: ICD-10-CM

## 2023-02-01 DIAGNOSIS — M25.511 CHRONIC RIGHT SHOULDER PAIN: ICD-10-CM

## 2023-02-01 DIAGNOSIS — F32.2 CURRENT SEVERE EPISODE OF MAJOR DEPRESSIVE DISORDER WITHOUT PSYCHOTIC FEATURES WITHOUT PRIOR EPISODE (HCC): ICD-10-CM

## 2023-02-01 DIAGNOSIS — R25.2 MUSCLE CRAMPING: ICD-10-CM

## 2023-02-01 RX ORDER — CYCLOBENZAPRINE HCL 5 MG
TABLET ORAL
Qty: 90 TABLET | Refills: 0 | Status: SHIPPED | OUTPATIENT
Start: 2023-02-01

## 2023-02-01 NOTE — PROGRESS NOTES
Name: Geoff Briones      : 1965      MRN: [id number]  Encounter Provider: Tennille Le MD  Encounter Date: 2023   Encounter department: [address] [place] [address]     1  Muscle spasms of both lower extremities  -     cyclobenzaprine (FLEXERIL) 5 mg tablet; Take 1 tablet in am and 2 tablets at bedtime    2  Chronic right shoulder pain  -     cyclobenzaprine (FLEXERIL) 5 mg tablet; Take 1 tablet in am and 2 tablets at bedtime    3  Muscle cramping  -     cyclobenzaprine (FLEXERIL) 5 mg tablet; Take 1 tablet in am and 2 tablets at bedtime    4  Sinus tachycardia  -     T3; Future  -     T4, free; Future  -     TSH, 3rd generation; Future  -     Comprehensive metabolic panel; Future  -     CBC and differential; Future    5  Current severe episode of major depressive disorder without psychotic features without prior episode ([facility] )           Subjective      The patient presents to the office for a follow-up visit  He has been showing some progress in physical therapy as far as his muscle spasms in his right shoulder and physical therapy also notes improvement in his expression of pain while doing therapy  The patient acknowledges that the range of motion of his right shoulder joint has improved with physical therapy to continue but would like to decrease the frequency of physical therapy to twice a week  Appear to be primarily musculoskeletal   Patient is noted to be persistently tachycardic in the office we have not checked his thyroid function studies yet  Will obtain studies following today's visit  Review of Systems   Constitutional: Negative  HENT: Negative  Eyes: Negative  Respiratory: Negative  Cardiovascular: Negative  Gastrointestinal: Negative  Endocrine: Negative  Genitourinary: Negative  Musculoskeletal: Positive for myalgias (Right shoulder and neck)  Skin: Negative  Allergic/Immunologic: Negative  Neurological: Negative for tremors, weakness and light-headedness  Hematological: Negative  Psychiatric/Behavioral: Positive for sleep disturbance  Current Outpatient Medications on File Prior to Visit   Medication Sig   • doxepin (SINEquan) 25 mg capsule Take 25 mg by mouth daily at bedtime   • DULoxetine (CYMBALTA) 30 mg delayed release capsule Take 30 mg by mouth daily   • FLUoxetine (PROzac) 20 mg capsule Take 20 mg by mouth daily   • HYDROcodone-acetaminophen (Norco) 5-325 mg per tablet Take 1 tablet by mouth every 6 (six) hours as needed for pain Max Daily Amount: 4 tablets   • lurasidone (LATUDA) 40 mg tablet Take 20 mg by mouth daily with breakfast   • pantoprazole (PROTONIX) 40 mg tablet Take 1 tablet (40 mg total) by mouth daily   • pregabalin (Lyrica) 50 mg capsule Take 1 capsule (50 mg total) by mouth 3 (three) times a day   • [DISCONTINUED] pramipexole (MIRAPEX) 0 5 mg tablet Take 1 tablet (0 5 mg total) by mouth 3 (three) times a day       Objective     /84 (BP Location: Left arm, Patient Position: Sitting, Cuff Size: Standard)   Pulse (!) 116   Temp 98 1 °F (36 7 °C) (Tympanic)   Ht 5' 9 69" (1 77 m)   Wt 95 3 kg (210 lb)   SpO2 97%   BMI 30 40 kg/m²     Physical Exam  Vitals reviewed  Constitutional:       General: He is not in acute distress  Appearance: Normal appearance  He is normal weight  He is not ill-appearing, toxic-appearing or diaphoretic  HENT:      Head: Normocephalic and atraumatic  Right Ear: External ear normal       Left Ear: External ear normal    Eyes:      General: No scleral icterus  Conjunctiva/sclera: Conjunctivae normal       Pupils: Pupils are equal, round, and reactive to light  Cardiovascular:      Rate and Rhythm: Regular rhythm  Tachycardia present  Heart sounds: Normal heart sounds  No murmur heard  Pulmonary:      Effort: Pulmonary effort is normal  No respiratory distress  Abdominal:      General: Abdomen is flat  Bowel sounds are normal  There is no distension  Tenderness: There is no abdominal tenderness  Musculoskeletal:      Cervical back: Neck supple  Right lower leg: No edema  Left lower leg: No edema  Lymphadenopathy:      Cervical: No cervical adenopathy  Skin:     General: Skin is warm  Capillary Refill: Capillary refill takes less than 2 seconds  Coloration: Skin is not jaundiced  Findings: No bruising, erythema or rash  Neurological:      General: No focal deficit present  Mental Status: He is alert and oriented to person, place, and time  Mental status is at baseline     Psychiatric:         Mood and Affect: Mood normal          Behavior: Behavior normal        Michelle Pruett MD

## 2023-02-01 NOTE — ASSESSMENT & PLAN NOTE
Patient does note some mild improvement in range of motion of his right shoulder  Physical therapy notes make mention that the patient is less demonstrative of pain during therapy sessions  Patient does acknowledge that there has been a decrease in his pain

## 2023-02-01 NOTE — ASSESSMENT & PLAN NOTE
To urine some labs CBC CMP and thyroid function studies patient has had a persistent tachycardia since his initial visit in early December

## 2023-02-07 ENCOUNTER — APPOINTMENT (OUTPATIENT)
Dept: LAB | Facility: CLINIC | Age: 58
End: 2023-02-07

## 2023-02-07 DIAGNOSIS — M79.10 MYALGIA: ICD-10-CM

## 2023-02-07 DIAGNOSIS — M62.838 MUSCLE SPASMS OF BOTH LOWER EXTREMITIES: ICD-10-CM

## 2023-02-07 DIAGNOSIS — M25.511 RIGHT SHOULDER PAIN, UNSPECIFIED CHRONICITY: ICD-10-CM

## 2023-02-07 DIAGNOSIS — R00.0 SINUS TACHYCARDIA: ICD-10-CM

## 2023-02-07 DIAGNOSIS — Z12.11 ENCOUNTER FOR SCREENING FOR MALIGNANT NEOPLASM OF COLON: ICD-10-CM

## 2023-02-07 LAB
ALBUMIN SERPL BCP-MCNC: 3.7 G/DL (ref 3.5–5)
ALP SERPL-CCNC: 52 U/L (ref 46–116)
ALT SERPL W P-5'-P-CCNC: 34 U/L (ref 12–78)
ANION GAP SERPL CALCULATED.3IONS-SCNC: 5 MMOL/L (ref 4–13)
AST SERPL W P-5'-P-CCNC: 24 U/L (ref 5–45)
BASOPHILS # BLD AUTO: 0.06 THOUSANDS/ÂΜL (ref 0–0.1)
BASOPHILS NFR BLD AUTO: 1 % (ref 0–1)
BILIRUB SERPL-MCNC: 0.46 MG/DL (ref 0.2–1)
BUN SERPL-MCNC: 17 MG/DL (ref 5–25)
CALCIUM SERPL-MCNC: 9.3 MG/DL (ref 8.3–10.1)
CHLORIDE SERPL-SCNC: 108 MMOL/L (ref 96–108)
CO2 SERPL-SCNC: 25 MMOL/L (ref 21–32)
CREAT SERPL-MCNC: 1.43 MG/DL (ref 0.6–1.3)
EOSINOPHIL # BLD AUTO: 0.23 THOUSAND/ÂΜL (ref 0–0.61)
EOSINOPHIL NFR BLD AUTO: 3 % (ref 0–6)
ERYTHROCYTE [DISTWIDTH] IN BLOOD BY AUTOMATED COUNT: 13.5 % (ref 11.6–15.1)
GFR SERPL CREATININE-BSD FRML MDRD: 54 ML/MIN/1.73SQ M
GLUCOSE SERPL-MCNC: 154 MG/DL (ref 65–140)
HCT VFR BLD AUTO: 47.2 % (ref 36.5–49.3)
HGB BLD-MCNC: 15.7 G/DL (ref 12–17)
IMM GRANULOCYTES # BLD AUTO: 0.02 THOUSAND/UL (ref 0–0.2)
IMM GRANULOCYTES NFR BLD AUTO: 0 % (ref 0–2)
LYMPHOCYTES # BLD AUTO: 1.72 THOUSANDS/ÂΜL (ref 0.6–4.47)
LYMPHOCYTES NFR BLD AUTO: 25 % (ref 14–44)
MCH RBC QN AUTO: 30.7 PG (ref 26.8–34.3)
MCHC RBC AUTO-ENTMCNC: 33.3 G/DL (ref 31.4–37.4)
MCV RBC AUTO: 92 FL (ref 82–98)
MONOCYTES # BLD AUTO: 0.57 THOUSAND/ÂΜL (ref 0.17–1.22)
MONOCYTES NFR BLD AUTO: 8 % (ref 4–12)
NEUTROPHILS # BLD AUTO: 4.39 THOUSANDS/ÂΜL (ref 1.85–7.62)
NEUTS SEG NFR BLD AUTO: 63 % (ref 43–75)
NRBC BLD AUTO-RTO: 0 /100 WBCS
PLATELET # BLD AUTO: 233 THOUSANDS/UL (ref 149–390)
PMV BLD AUTO: 10.3 FL (ref 8.9–12.7)
POTASSIUM SERPL-SCNC: 3.8 MMOL/L (ref 3.5–5.3)
PROT SERPL-MCNC: 7.6 G/DL (ref 6.4–8.4)
RBC # BLD AUTO: 5.11 MILLION/UL (ref 3.88–5.62)
SODIUM SERPL-SCNC: 138 MMOL/L (ref 135–147)
T3 SERPL-MCNC: 1.5 NG/ML (ref 0.6–1.8)
T4 FREE SERPL-MCNC: 0.8 NG/DL (ref 0.76–1.46)
TSH SERPL DL<=0.05 MIU/L-ACNC: 2.67 UIU/ML (ref 0.45–4.5)
WBC # BLD AUTO: 6.99 THOUSAND/UL (ref 4.31–10.16)

## 2023-02-17 ENCOUNTER — TELEPHONE (OUTPATIENT)
Dept: INTERNAL MEDICINE CLINIC | Facility: CLINIC | Age: 58
End: 2023-02-17

## 2023-02-17 NOTE — TELEPHONE ENCOUNTER
Request for medical records from [place] [employer]  Scanned into patient's chart and Faxed to [facility] on 2/17/2023

## 2023-03-17 ENCOUNTER — TELEPHONE (OUTPATIENT)
Dept: OBGYN CLINIC | Facility: OTHER | Age: 58
End: 2023-03-17

## 2023-03-17 ENCOUNTER — OFFICE VISIT (OUTPATIENT)
Dept: INTERNAL MEDICINE CLINIC | Facility: CLINIC | Age: 58
End: 2023-03-17

## 2023-03-17 VITALS
BODY MASS INDEX: 32.29 KG/M2 | WEIGHT: 218 LBS | HEIGHT: 69 IN | OXYGEN SATURATION: 94 % | SYSTOLIC BLOOD PRESSURE: 128 MMHG | TEMPERATURE: 98 F | HEART RATE: 94 BPM | DIASTOLIC BLOOD PRESSURE: 82 MMHG

## 2023-03-17 DIAGNOSIS — M62.838 MUSCLE SPASMS OF BOTH LOWER EXTREMITIES: ICD-10-CM

## 2023-03-17 DIAGNOSIS — R25.2 MUSCLE CRAMPING: ICD-10-CM

## 2023-03-17 DIAGNOSIS — F32.2 CURRENT SEVERE EPISODE OF MAJOR DEPRESSIVE DISORDER WITHOUT PSYCHOTIC FEATURES WITHOUT PRIOR EPISODE (HCC): ICD-10-CM

## 2023-03-17 DIAGNOSIS — G56.01 RIGHT CARPAL TUNNEL SYNDROME: Primary | ICD-10-CM

## 2023-03-17 DIAGNOSIS — M25.511 CHRONIC RIGHT SHOULDER PAIN: ICD-10-CM

## 2023-03-17 DIAGNOSIS — G89.29 CHRONIC RIGHT SHOULDER PAIN: ICD-10-CM

## 2023-03-17 DIAGNOSIS — Z12.11 ENCOUNTER FOR SCREENING FOR MALIGNANT NEOPLASM OF COLON: ICD-10-CM

## 2023-03-17 DIAGNOSIS — M79.10 MYALGIA: ICD-10-CM

## 2023-03-17 RX ORDER — PREGABALIN 100 MG/1
100 CAPSULE ORAL 3 TIMES DAILY
Qty: 90 CAPSULE | Refills: 1 | Status: SHIPPED | OUTPATIENT
Start: 2023-03-17

## 2023-03-17 NOTE — PROGRESS NOTES
Name: Baldev Nye      : 1965      MRN: [id number]  Encounter Provider: Divina Gabriel MD  Encounter Date: 3/17/2023   Encounter department: [address] [place] [address]  Right carpal tunnel syndrome  Assessment & Plan:  Progressive weakness of his right hand and fingers  On occasion he has dropped items due to lack of  strength  EMG confirmed right carpal tunnel syndrome  Referring patient to orthopedic surgery for additional evaluation    Orders:  -     Ambulatory Referral to Orthopedic Surgery; Future    2  Encounter for screening for malignant neoplasm of colon  -     Ambulatory referral for colonoscopy; Future    3  Chronic right shoulder pain  Assessment & Plan:  Right shoulder pain worse with physical therapy  X-rays negative we will schedule patient for an MRI    Orders:  -     MRI shoulder right wo contrast; Future; Expected date: 2023    4  Muscle cramping  Assessment & Plan:  Continuous myalgias  Immunologic markers other than a mildly elevated sed rate and a positive rheumatoid factor of 27 are all unremarkable  No improvement with a trial of high-dose steroid therapy 40 mg daily      5  Current severe episode of major depressive disorder without psychotic features without prior episode ([facility] )    6  Muscle spasms of both lower extremities  Assessment & Plan:  Continues to experience muscle spasms of his lower extremities    Orders:  -     pregabalin (Lyrica) 100 mg capsule; Take 1 capsule (100 mg total) by mouth 3 (three) times a day    7  Myalgia  -     pregabalin (Lyrica) 100 mg capsule; Take 1 capsule (100 mg total) by mouth 3 (three) times a day           Subjective      Patient presents to the office for follow-up visit  Continues to experience diffuse generalized muscle aches and myalgias    His labs are essentially normal with the exception of a mildly elevated sed rate of 25 and a positive rheumatoid factor of 27     Review of Systems   Constitutional: Positive for activity change (Limited due to muscle aches and right shoulder pain and right hand weakness)  HENT: Negative  Eyes: Negative  Respiratory: Negative  Cardiovascular: Negative  Gastrointestinal: Negative  Endocrine: Negative  Genitourinary: Negative  Musculoskeletal: Positive for arthralgias (Right shoulder) and myalgias (Generalized)  Allergic/Immunologic: Negative  Neurological: Positive for weakness (Right hand)  Negative for dizziness, facial asymmetry, light-headedness, numbness and headaches  Psychiatric/Behavioral:          Depression primarily fueled by his physical debilities      Depression Screening Follow-up Plan: Patient's depression screening was positive with a PHQ-2 score of   Their PHQ-9 score was 25  Continue regular follow-up with their psychologist/therapist/psychiatrist who is managing their mental health condition(s)    Current Outpatient Medications on File Prior to Visit   Medication Sig   • cyclobenzaprine (FLEXERIL) 5 mg tablet Take 1 tablet in am and 2 tablets at bedtime   • doxepin (SINEquan) 25 mg capsule Take 25 mg by mouth daily at bedtime   • DULoxetine (CYMBALTA) 30 mg delayed release capsule Take 30 mg by mouth daily   • FLUoxetine (PROzac) 20 mg capsule Take 20 mg by mouth daily   • HYDROcodone-acetaminophen (Norco) 5-325 mg per tablet Take 1 tablet by mouth every 6 (six) hours as needed for pain Max Daily Amount: 4 tablets   • lurasidone (LATUDA) 40 mg tablet Take 40 mg by mouth daily with breakfast   • pantoprazole (PROTONIX) 40 mg tablet Take 1 tablet (40 mg total) by mouth daily   • [DISCONTINUED] pregabalin (Lyrica) 50 mg capsule Take 1 capsule (50 mg total) by mouth 3 (three) times a day       Objective     /82 (BP Location: Left arm, Patient Position: Sitting, Cuff Size: Large)   Pulse 94   Temp 98 °F (36 7 °C) (Tympanic)   Ht 5' 9 21" (1 758 m)   Wt 98 9 kg (218 lb)   SpO2 94% BMI 32 00 kg/m²     Physical Exam  Vitals reviewed  Constitutional:       General: He is not in acute distress  Appearance: Normal appearance  He is normal weight  He is not ill-appearing, toxic-appearing or diaphoretic  HENT:      Head: Normocephalic and atraumatic  Right Ear: External ear normal       Left Ear: External ear normal       Nose: Nose normal    Eyes:      General: No scleral icterus  Conjunctiva/sclera: Conjunctivae normal       Pupils: Pupils are equal, round, and reactive to light  Neck:      Vascular: No carotid bruit or JVD  Trachea: No tracheal deviation  Cardiovascular:      Rate and Rhythm: Normal rate and regular rhythm  Pulses: Normal pulses  Heart sounds: Normal heart sounds  No murmur heard  Pulmonary:      Effort: Pulmonary effort is normal  No respiratory distress  Breath sounds: Normal breath sounds  No rales  Abdominal:      General: Abdomen is flat  There is no distension  Musculoskeletal:         General: Tenderness (Right shoulder) present  No swelling, deformity or signs of injury  Cervical back: Neck supple  Right lower leg: No edema  Left lower leg: No edema  Skin:     General: Skin is warm  Coloration: Skin is not jaundiced  Findings: No bruising, erythema or rash  Neurological:      General: No focal deficit present  Mental Status: He is alert and oriented to person, place, and time  Mental status is at baseline     Psychiatric:         Mood and Affect: Mood normal          Behavior: Behavior normal       Comments: Very depressed over the lack of answers for his physical wellbeing       Zack Rivas MD

## 2023-03-17 NOTE — TELEPHONE ENCOUNTER
Patient is being referred to a orthopedics  Please schedule accordingly      [facility]   [phone]

## 2023-03-17 NOTE — ASSESSMENT & PLAN NOTE
Right shoulder pain worse with physical therapy    X-rays negative we will schedule patient for an MRI

## 2023-03-17 NOTE — PROGRESS NOTES
Depression Screening Follow-up Plan: Patient's depression screening was positive with a PHQ-2 score of   Their PHQ-9 score was 25   {Depression screen follow-up plan:[id number]}

## 2023-03-17 NOTE — ASSESSMENT & PLAN NOTE
Continuous myalgias  Immunologic markers other than a mildly elevated sed rate and a positive rheumatoid factor of 27 are all unremarkable    No improvement with a trial of high-dose steroid therapy 40 mg daily

## 2023-03-17 NOTE — ASSESSMENT & PLAN NOTE
Progressive weakness of his right hand and fingers  On occasion he has dropped items due to lack of  strength  EMG confirmed right carpal tunnel syndrome    Referring patient to orthopedic surgery for additional evaluation

## 2023-03-28 ENCOUNTER — TELEPHONE (OUTPATIENT)
Dept: INTERNAL MEDICINE CLINIC | Facility: CLINIC | Age: 58
End: 2023-03-28

## 2023-03-31 ENCOUNTER — HOSPITAL ENCOUNTER (OUTPATIENT)
Dept: RADIOLOGY | Facility: HOSPITAL | Age: 58
Discharge: HOME | End: 2023-03-31
Attending: INTERNAL MEDICINE

## 2023-03-31 DIAGNOSIS — G89.29 CHRONIC RIGHT SHOULDER PAIN: ICD-10-CM

## 2023-03-31 DIAGNOSIS — M25.511 CHRONIC RIGHT SHOULDER PAIN: ICD-10-CM

## 2023-05-04 ENCOUNTER — OFFICE VISIT (OUTPATIENT)
Dept: INTERNAL MEDICINE CLINIC | Facility: CLINIC | Age: 58
End: 2023-05-04

## 2023-05-04 VITALS
WEIGHT: 213.4 LBS | SYSTOLIC BLOOD PRESSURE: 132 MMHG | HEART RATE: 108 BPM | OXYGEN SATURATION: 98 % | DIASTOLIC BLOOD PRESSURE: 80 MMHG | BODY MASS INDEX: 31.61 KG/M2 | TEMPERATURE: 98.7 F | HEIGHT: 69 IN

## 2023-05-04 DIAGNOSIS — R25.2 MUSCLE CRAMPING: ICD-10-CM

## 2023-05-04 DIAGNOSIS — M75.51 SUBDELTOID BURSITIS OF RIGHT SHOULDER JOINT: ICD-10-CM

## 2023-05-04 DIAGNOSIS — M25.511 PAIN, JOINT, SHOULDER REGION, RIGHT: ICD-10-CM

## 2023-05-04 DIAGNOSIS — G56.01 CARPAL TUNNEL SYNDROME OF RIGHT WRIST: ICD-10-CM

## 2023-05-04 DIAGNOSIS — M75.21 TENDONITIS OF LONG HEAD OF BICEPS BRACHII OF RIGHT SHOULDER: Primary | ICD-10-CM

## 2023-05-04 DIAGNOSIS — M62.838 MUSCLE SPASMS OF BOTH LOWER EXTREMITIES: ICD-10-CM

## 2023-05-04 DIAGNOSIS — M25.511 CHRONIC RIGHT SHOULDER PAIN: ICD-10-CM

## 2023-05-04 DIAGNOSIS — G89.29 CHRONIC RIGHT SHOULDER PAIN: ICD-10-CM

## 2023-05-04 RX ORDER — CYCLOBENZAPRINE HCL 10 MG
10 TABLET ORAL
Qty: 30 TABLET | Refills: 1 | Status: SHIPPED | OUTPATIENT
Start: 2023-05-04

## 2023-05-04 RX ORDER — HYDROCODONE BITARTRATE AND ACETAMINOPHEN 5; 325 MG/1; MG/1
1 TABLET ORAL EVERY 6 HOURS PRN
Qty: 60 TABLET | Refills: 0 | Status: SHIPPED | OUTPATIENT
Start: 2023-05-04

## 2023-05-04 NOTE — PROGRESS NOTES
Name: Whitley Justice      : 1965      MRN: [id number]  Encounter Provider: Rodrigo Simon MD  Encounter Date: 2023   Encounter department: [address] [place] [address]     1  Tendonitis of long head of biceps brachii of right shoulder  -     diclofenac sodium (VOLTAREN) 50 mg EC tablet; Take 1 tablet (50 mg total) by mouth 2 (two) times a day    2  Pain, joint, shoulder region, right  -     HYDROcodone-acetaminophen (Norco) 5-325 mg per tablet; Take 1 tablet by mouth every 6 (six) hours as needed for pain Max Daily Amount: 4 tablets  -     Diclofenac Sodium (VOLTAREN) 1 %; Apply 2 g topically 2 (two) times a day    3  Muscle spasms of both lower extremities  -     cyclobenzaprine (FLEXERIL) 10 mg tablet; Take 1 tablet (10 mg total) by mouth daily at bedtime    4  Chronic right shoulder pain  -     cyclobenzaprine (FLEXERIL) 10 mg tablet; Take 1 tablet (10 mg total) by mouth daily at bedtime    5  Muscle cramping  -     cyclobenzaprine (FLEXERIL) 10 mg tablet; Take 1 tablet (10 mg total) by mouth daily at bedtime    6  Subdeltoid bursitis of right shoulder joint  -     diclofenac sodium (VOLTAREN) 50 mg EC tablet; Take 1 tablet (50 mg total) by mouth 2 (two) times a day    7  Carpal tunnel syndrome of right wrist  -     Cock Up Wrist Splint           Subjective      Patient presents for follow-up visit for right shoulder pain  He reports some improvement with the medications  MRI of his right shoulder reveals some tendinitis of the long head of the biceps without a tear as well as some tinnitus of the subdeltoid and subacromial tendons  Skilled therapy was advised but the patient refused having gone through physical therapy previously which made it worse  He is still having issues with his right carpal tunnel  He does not have a splint we will have 1 ordered  Medications were reviewed    He is not taking any anti-inflammatory medications so we will "initiate this as well  Has had no new lab work done    Review of Systems   Constitutional: Negative  HENT: Negative  Eyes: Negative  Respiratory: Negative  Cardiovascular: Negative  Gastrointestinal: Negative  Endocrine: Negative  Genitourinary: Negative  Musculoskeletal: Positive for arthralgias (Shoulder) and joint swelling (Right shoulder tendinitis as noted)  Skin: Negative  Neurological: Negative  Hematological: Negative  Psychiatric/Behavioral: Negative  Current Outpatient Medications on File Prior to Visit   Medication Sig    doxepin (SINEquan) 25 mg capsule Take 25 mg by mouth daily at bedtime    DULoxetine (CYMBALTA) 30 mg delayed release capsule Take 30 mg by mouth daily    FLUoxetine (PROzac) 20 mg capsule Take 20 mg by mouth daily    lurasidone (LATUDA) 40 mg tablet Take 40 mg by mouth daily with breakfast    pantoprazole (PROTONIX) 40 mg tablet Take 1 tablet (40 mg total) by mouth daily    pregabalin (Lyrica) 100 mg capsule Take 1 capsule (100 mg total) by mouth 3 (three) times a day    [DISCONTINUED] cyclobenzaprine (FLEXERIL) 5 mg tablet Take 1 tablet in am and 2 tablets at bedtime (Patient taking differently: Take 10 mg by mouth daily at bedtime)    [DISCONTINUED] HYDROcodone-acetaminophen (Norco) 5-325 mg per tablet Take 1 tablet by mouth every 6 (six) hours as needed for pain Max Daily Amount: 4 tablets       Objective     /80 (BP Location: Left arm, Patient Position: Sitting, Cuff Size: Large)   Pulse (!) 108   Temp 98 7 °F (37 1 °C) (Tympanic)   Ht 5' 9\" (1 753 m)   Wt 96 8 kg (213 lb 6 4 oz)   SpO2 98%   BMI 31 51 kg/m²     Physical Exam  Vitals reviewed  Constitutional:       General: He is not in acute distress  Appearance: Normal appearance  He is normal weight  He is not ill-appearing, toxic-appearing or diaphoretic  HENT:      Head: Normocephalic and atraumatic        Right Ear: External ear normal       Left Ear: " External ear normal       Nose: Nose normal    Eyes:      General: No scleral icterus  Conjunctiva/sclera: Conjunctivae normal       Pupils: Pupils are equal, round, and reactive to light  Neck:      Vascular: No carotid bruit or JVD  Trachea: No tracheal deviation  Cardiovascular:      Rate and Rhythm: Normal rate  Pulmonary:      Effort: Pulmonary effort is normal  No respiratory distress  Abdominal:      General: Abdomen is flat  There is no distension  Musculoskeletal:         General: Tenderness (On range of motion of the right shoulder) present  No swelling  Cervical back: Neck supple  Right lower leg: No edema  Left lower leg: No edema  Skin:     General: Skin is warm  Coloration: Skin is not jaundiced  Findings: No bruising, erythema or rash  Neurological:      General: No focal deficit present  Mental Status: He is alert and oriented to person, place, and time  Mental status is at baseline     Psychiatric:         Mood and Affect: Mood normal          Behavior: Behavior normal        Vernal Shadow, MD

## 2023-05-09 ENCOUNTER — CONSULT (OUTPATIENT)
Dept: GASTROENTEROLOGY | Facility: CLINIC | Age: 58
End: 2023-05-09

## 2023-05-09 VITALS
HEART RATE: 88 BPM | SYSTOLIC BLOOD PRESSURE: 119 MMHG | BODY MASS INDEX: 29.78 KG/M2 | TEMPERATURE: 98.9 F | HEIGHT: 70 IN | DIASTOLIC BLOOD PRESSURE: 84 MMHG | WEIGHT: 208 LBS

## 2023-05-09 DIAGNOSIS — K21.9 GASTROESOPHAGEAL REFLUX DISEASE, UNSPECIFIED WHETHER ESOPHAGITIS PRESENT: Primary | ICD-10-CM

## 2023-05-09 DIAGNOSIS — Z12.11 ENCOUNTER FOR SCREENING FOR MALIGNANT NEOPLASM OF COLON: ICD-10-CM

## 2023-05-09 RX ORDER — BISACODYL 5 MG/1
5 TABLET, DELAYED RELEASE ORAL ONCE
Qty: 2 TABLET | Refills: 0 | Status: SHIPPED | OUTPATIENT
Start: 2023-05-09 | End: 2023-05-09

## 2023-05-09 NOTE — PATIENT INSTRUCTIONS
Scheduled date of EGD/colonoscopy (as of today):07/06/2023  Physician performing EGD/colonoscopy:Cole  Location of EGD/colonoscopy: [address]  Desired bowel prep reviewed with patient: Parveen Munoz  Instructions reviewed with patient by: Gulshan Cao  Clearances:   N/A

## 2023-05-09 NOTE — PROGRESS NOTES
[facility] - Outpatient Consultation  Godwin Kahn Colon 65 y o  male MRN: [id number]  Encounter: [id number]          ASSESSMENT AND PLAN:        1  Gastroesophageal reflux disease, unspecified whether esophagitis present  EGD      2  Encounter for screening for malignant neoplasm of colon  Ambulatory referral for colonoscopy    Colonoscopy    bisacodyl (DULCOLAX) 5 mg EC tablet    polyethylene glycol (GOLYTELY) 4000 mL solution          Will proceed with screening colonoscopy and EGD for longstanding history of GERD/remote history of ulcers  The rationale for colonoscopy with possible biopsy, possible polypectomy, with IV sedation as well as all risks, benefits, and alternatives were discussed with the patient in detail  Risks including but not limited to perforation, bleeding, need for blood transfusion or emergent surgery, and missed neoplasm were reviewed in detail with the patient  The patient demonstrates full understanding and wishes to proceed with the colonoscopy   ______________________________________________________________    HPI:  Gennaro Lowry is a 65y o  year old male who presents to the office for evaluation for colorectal cancer screening and history of gastroesophageal reflux disease    Reported symptoms: none  Family history: no known risk factors  Previous colonoscopy: none       He does have history of ulcers in his stomach  He was taking Prilosec and now Protonix for his stomach  He has had issues with reflux and gastric discomfort/stress for years  He is a  by profession and he is concerned the ingestion of smoke/fumes contributes to his reflux  He says the pantoprazole has helped with this  No blood in stool  No weight loss  No family history of colon cancer  No change in bowel habits  They are no on blood thinners  REVIEW OF SYSTEMS:    CONSTITUTIONAL: Denies any fever, chills, rigors, and weight loss  HEENT: No earache or tinnitus   Denies hearing loss or visual disturbances  CARDIOVASCULAR: No chest pain or palpitations  RESPIRATORY: Denies any cough, hemoptysis, shortness of breath or dyspnea on exertion  GASTROINTESTINAL: As noted in the History of Present Illness  GENITOURINARY: No problems with urination  Denies any hematuria or dysuria  NEUROLOGIC: No dizziness or vertigo, denies headaches  MUSCULOSKELETAL: Denies any muscle or joint pain  SKIN: Denies skin rashes or itching  ENDOCRINE: Denies excessive thirst  Denies intolerance to heat or cold  PSYCHOSOCIAL: Denies depression or anxiety  Denies any recent memory loss  Historical Information   Past Medical History:   Diagnosis Date   • Anxiety    • Depression    • GERD (gastroesophageal reflux disease)    • Mood swings    • Rheumatic fever     age 7     History reviewed  No pertinent surgical history    Social History   Social History     Substance and Sexual Activity   Alcohol Use Yes   • Alcohol/week: 6 0 standard drinks   • Types: 6 Cans of beer per week    Comment: weekends     Social History     Substance and Sexual Activity   Drug Use Never     Social History     Tobacco Use   Smoking Status Every Day   • Packs/day: 0 03   • Years: 34 00   • Pack years: 1 02   • Types: Cigarettes   • Start date: 1989   Smokeless Tobacco Never   Tobacco Comments    3-4 cigarettes day     Family History   Problem Relation Age of Onset   • Diabetes Mother    • Kidney disease Mother    • Heart disease Father    • Leukemia Father 68       Meds/Allergies       Current Outpatient Medications:   •  cyclobenzaprine (FLEXERIL) 10 mg tablet  •  Diclofenac Sodium (VOLTAREN) 1 %  •  diclofenac sodium (VOLTAREN) 50 mg EC tablet  •  doxepin (SINEquan) 25 mg capsule  •  DULoxetine (CYMBALTA) 30 mg delayed release capsule  •  HYDROcodone-acetaminophen (Norco) 5-325 mg per tablet  •  lurasidone (LATUDA) 40 mg tablet  •  pantoprazole (PROTONIX) 40 mg tablet  •  pregabalin (Lyrica) 100 mg capsule  • "FLUoxetine (PROzac) 20 mg capsule    Allergies   Allergen Reactions   • Penicillins Anaphylaxis           Objective     Blood pressure 119/84, pulse 88, temperature 98 9 °F (37 2 °C), temperature source Tympanic, height 5' 10\" (1 778 m), weight 94 3 kg (208 lb)  Body mass index is 29 84 kg/m²  PHYSICAL EXAM:      General Appearance:   Alert, cooperative, no distress   HEENT:   Normocephalic, atraumatic, anicteric  Lungs:   Equal chest rise and unlabored breathing, normal cough   Heart:   No visualized JVD   Abdomen:   Soft, non-tender, non-distended; no masses, no organomegaly    Genitalia:   Deferred    Rectal:   Deferred    Extremities:  No cyanosis, clubbing or edema    Pulses:  Musculoskeletal:  2+ and symmetric  Normal range of motion visualized    Skin:  Neuro:  No jaundice, rashes, or lesions   Alert and appropriate       Lab Results:   No visits with results within 1 Day(s) from this visit  Latest known visit with results is:   Appointment on 02/07/2023   Component Date Value   • T3, Total 02/07/2023 1 50    • Free T4 02/07/2023 0 80    • TSH 3RD GENERATON 02/07/2023 2 670    • Sodium 02/07/2023 138    • Potassium 02/07/2023 3 8    • Chloride 02/07/2023 108    • CO2 02/07/2023 25    • ANION GAP 02/07/2023 5    • BUN 02/07/2023 17    • Creatinine 02/07/2023 1 43 (H)    • Glucose 02/07/2023 154 (H)    • Calcium 02/07/2023 9 3    • AST 02/07/2023 24    • ALT 02/07/2023 34    • Alkaline Phosphatase 02/07/2023 52    • Total Protein 02/07/2023 7 6    • Albumin 02/07/2023 3 7    • Total Bilirubin 02/07/2023 0 46    • eGFR 02/07/2023 54    • WBC 02/07/2023 6 99    • RBC 02/07/2023 5  11    • Hemoglobin 02/07/2023 15 7    • Hematocrit 02/07/2023 47 2    • MCV 02/07/2023 92    • MCH 02/07/2023 30 7    • MCHC 02/07/2023 33 3    • RDW 02/07/2023 13 5    • MPV 02/07/2023 10 3    • Platelets 16/36/9344 233    • nRBC 02/07/2023 0    • Neutrophils Relative 02/07/2023 63    • Immat GRANS % 02/07/2023 0    • " Lymphocytes Relative 02/07/2023 25    • Monocytes Relative 02/07/2023 8    • Eosinophils Relative 02/07/2023 3    • Basophils Relative 02/07/2023 1    • Neutrophils Absolute 02/07/2023 4 39    • Immature Grans Absolute 02/07/2023 0 02    • Lymphocytes Absolute 02/07/2023 1 72    • Monocytes Absolute 02/07/2023 0 57    • Eosinophils Absolute 02/07/2023 0 23    • Basophils Absolute 02/07/2023 0 06          Radiology Results:   No results found

## 2023-05-17 ENCOUNTER — TELEPHONE (OUTPATIENT)
Dept: INTERNAL MEDICINE CLINIC | Facility: CLINIC | Age: 58
End: 2023-05-17

## 2023-05-17 DIAGNOSIS — K21.00 GASTROESOPHAGEAL REFLUX DISEASE WITH ESOPHAGITIS WITHOUT HEMORRHAGE: ICD-10-CM

## 2023-05-17 RX ORDER — PANTOPRAZOLE SODIUM 40 MG/1
40 TABLET, DELAYED RELEASE ORAL DAILY
Qty: 30 TABLET | Refills: 5 | Status: SHIPPED | OUTPATIENT
Start: 2023-05-17

## 2023-05-17 NOTE — TELEPHONE ENCOUNTER
rx refill for pantoprazole (PROTONIX) 40 mg tablet      Send [facility] #[id number] Mookie Rosenthal, [address] [place] [address] 69     Nov - 07/13/23

## 2023-05-24 ENCOUNTER — HOSPITAL ENCOUNTER (OUTPATIENT)
Dept: RADIOLOGY | Facility: HOSPITAL | Age: 58
Discharge: HOME | End: 2023-05-24
Attending: ORTHOPAEDIC SURGERY

## 2023-05-24 ENCOUNTER — OFFICE VISIT (OUTPATIENT)
Dept: OBGYN CLINIC | Facility: HOSPITAL | Age: 58
End: 2023-05-24

## 2023-05-24 VITALS
DIASTOLIC BLOOD PRESSURE: 91 MMHG | HEART RATE: 109 BPM | HEIGHT: 70 IN | SYSTOLIC BLOOD PRESSURE: 131 MMHG | BODY MASS INDEX: 29.95 KG/M2 | WEIGHT: 209.2 LBS

## 2023-05-24 DIAGNOSIS — G56.01 RIGHT CARPAL TUNNEL SYNDROME: ICD-10-CM

## 2023-05-24 DIAGNOSIS — R52 PAIN: ICD-10-CM

## 2023-05-24 DIAGNOSIS — R52 PAIN: Primary | ICD-10-CM

## 2023-05-24 RX ORDER — CLINDAMYCIN PHOSPHATE 900 MG/50ML
900 INJECTION INTRAVENOUS ONCE
OUTPATIENT
Start: 2023-05-24 | End: 2023-05-24

## 2023-05-24 NOTE — PROGRESS NOTES
ASSESSMENT/PLAN:    Assessment:   Carpal Tunnel Syndrome  right and Thumb CMC Arthritis  right     Plan:   Endoscopic Carpal Tunnel Release  right under sedation    Follow Up: After Surgery    To Do Next Visit:    and Sutures out    General Discussions:     [facility] Arthritis: The anatomy and physiology of carpometacarpal joint arthritis was discussed with the patient today in the office  Deterioration of the articular cartilage eventually leads to hypermobility at the thumb [facility] joint, resulting in joint subluxation, osteophyte formation, cystic changes within the trapezium and base of the first metacarpal, as well as subchondral sclerosis  Eventually, pain, limited mobility, and compensatory hyperextension at the metacarpophalangeal joint may develop  While normal activity and usage of the thumb joint may provide a painful experience to the patient, this typically does not result in damage to the thumb or hand  Treatment options include resting thumb spica splints to decreased joint edema, pain, and inflammation  Therapy exercises to strengthen the thenar musculature may relieve pain, but do not alter the overall continued development of osteoarthritis  Oral medications, topical medications, corticosteroid injections may decrease pain and increase overall function  Eventually, approximately 5% of patients may require surgical intervention  Carpal Tunnel Syndrome: The anatomy and physiology of carpal tunnel syndrome was discussed with the patient today  Increase pressure localized under the transverse carpal ligament can cause pain, numbness, tingling, or dysesthesias within the median nerve distribution as well as feelings of fatigue, clumsiness, or awkwardness  These symptoms typically occur at night and worse in the morning upon waking  Eventually, untreated carpal tunnel syndrome can result in weakness and permanent loss of muscle within the thenar compartment of the hand    Treatment options were discussed with the patient  Conservative treatment includes nocturnal resting splints to keep the nerve in a neutral position, ergonomic changes within the work or home environment, activity modification, and tendon gliding exercises  Steroid injections within the carpal canal can help a majority of patients, however this is often self-limited in a majority of patients  Surgical intervention to divide the transverse carpal ligament typically results in a long-lasting relief of the patient's complaints, with the recurrence rate of less than 1%  Operative Discussions:     Endoscopic Carpal Tunnel Release: The anatomy and physiology of carpal tunnel syndrome was discussed with the patient today  Increase pressure localized under the transverse carpal ligament can cause pain, numbness, tingling, or dysesthesias within the median nerve distribution as well as feelings of fatigue, clumsiness, or awkwardness  These symptoms typically occur at night and worse in the morning upon waking  Eventually, untreated carpal tunnel syndrome can result in weakness and permanent loss of muscle within the thenar compartment of the hand  Treatment options were discussed with the patient  Conservative treatment includes nocturnal resting splints to keep the nerve in a neutral position, ergonomic changes within the work or home environment, activity modification, and tendon gliding exercises  Steroid injections within the carpal canal can help a majority of patients, however this is often self-limited in a majority of patients  Surgical intervention to divide the transverse carpal ligament typically results in a long-lasting relief of the patient's complaints, with the recurrence rate of less than 1%  The patient has elected to undergo an endoscopic carpal tunnel release  The 2 incision technique was discussed with the patient, which results in approximately a two-week less recovery time, and less wound complications    In the postoperative period, light activities are allowed immediately, driving is allowed when narcotic medication has stopped, and the bandages may be removed and incision may get wet after 2 days  Heavy activities (lifting more than approximately 10 pounds) will be allowed after follow up appointment in 1-2 weeks  While the pain and discomfort in the hands generally improves rapidly, the numbness and tingling as well as the strength will slowly improve over weeks to months depending on the severity of the carpal tunnel syndrome  Pillar pain was discussed with the patient, which is typically a common but self-limiting condition  The risks of bleeding and infection from the surgery are less than 1%  Risk of recurrence is approximately 0 5%  The risks of nerve injury or nerve damage or damage to the blood vessels is approximately 1 in 1200  The patient has an understanding of the above mentioned discussion  The risks and benefits of the procedure were explained to the patient, which include, but are not limited to: Bleeding, infection, recurrence, pain, scar, damage to tendons, damage to nerves, and damage to blood vessels, failure to give desired results and complications related to anesthesia   These risks, along with alternative conservative treatment options, and postoperative protocols were voiced back and understood by the patient   All questions were answered to the patient's satisfaction   The patient agrees to comply with a standard postoperative protocol, and is willing to proceed   Education was provided via written and auditory forms   There were no barriers to learning   Written handouts regarding wound care, incision and scar care, and general preoperative information was provided to the patient   Prior to surgery, the patient may be requested to stop all anti-inflammatory medications   Prophylactic aspirin, Plavix, and Coumadin may be allowed to be continued   Medications including vitamin E , ginkgo, "and fish oil are requested to be stopped approximately one week prior to surgery   Hypertensive medications and beta blockers, if taken, should be continued  _____________________________________________________  CHIEF COMPLAINT:  Chief Complaint   Patient presents with   • Right Wrist - Numbness     EMG 12/15/22 in procedures          SUBJECTIVE:  Carmelita Nicole is a 65 y o  male who presents with a one year history of pain, numbness, and tingling of his right hand  Patient states that prolonged use of his hand leads to a \"cramping\" pain and causes his hand to begin shaking  He recently stopped working at his job as a  due to the pain, numbness, and weakness he experiences with continuous use of his dominant R hand  Patient underwent R UE EMG on 11/11/2022 which demonstrated findings consistent with CTS in the R wrist and hand  Patient stated his pain is most severe at the base of his thumb, pointed towards CMCJ of R thumb  PAST MEDICAL HISTORY:  Past Medical History:   Diagnosis Date   • Anxiety    • Depression    • GERD (gastroesophageal reflux disease)    • Mood swings    • Rheumatic fever     age 7       PAST SURGICAL HISTORY:  History reviewed  No pertinent surgical history  FAMILY HISTORY:  Family History   Problem Relation Age of Onset   • Diabetes Mother    • Kidney disease Mother    • Heart disease Father    • Leukemia Father 68       SOCIAL HISTORY:  Social History     Tobacco Use   • Smoking status: Every Day     Packs/day: 0 03     Years: 34 00     Total pack years: 1 02     Types: Cigarettes     Start date: 1989   • Smokeless tobacco: Never   • Tobacco comments:     3-4 cigarettes day   Vaping Use   • Vaping Use: Never used   Substance Use Topics   • Alcohol use:  Yes     Alcohol/week: 6 0 standard drinks of alcohol     Types: 6 Cans of beer per week     Comment: weekends   • Drug use: Never       MEDICATIONS:    Current Outpatient Medications:   •  bisacodyl (DULCOLAX) 5 mg EC tablet, " "Take 1 tablet (5 mg total) by mouth once for 1 dose, Disp: 2 tablet, Rfl: 0  •  cyclobenzaprine (FLEXERIL) 10 mg tablet, Take 1 tablet (10 mg total) by mouth daily at bedtime, Disp: 30 tablet, Rfl: 1  •  Diclofenac Sodium (VOLTAREN) 1 %, Apply 2 g topically 2 (two) times a day, Disp: 150 g, Rfl: 1  •  diclofenac sodium (VOLTAREN) 50 mg EC tablet, Take 1 tablet (50 mg total) by mouth 2 (two) times a day, Disp: 60 tablet, Rfl: 1  •  doxepin (SINEquan) 25 mg capsule, Take 25 mg by mouth daily at bedtime, Disp: , Rfl:   •  DULoxetine (CYMBALTA) 30 mg delayed release capsule, Take 30 mg by mouth daily, Disp: , Rfl:   •  FLUoxetine (PROzac) 20 mg capsule, Take 20 mg by mouth daily, Disp: , Rfl:   •  HYDROcodone-acetaminophen (Norco) 5-325 mg per tablet, Take 1 tablet by mouth every 6 (six) hours as needed for pain Max Daily Amount: 4 tablets, Disp: 60 tablet, Rfl: 0  •  lurasidone (LATUDA) 40 mg tablet, Take 40 mg by mouth daily with breakfast, Disp: , Rfl:   •  pantoprazole (PROTONIX) 40 mg tablet, Take 1 tablet (40 mg total) by mouth daily, Disp: 30 tablet, Rfl: 5  •  polyethylene glycol (GOLYTELY) 4000 mL solution, Take 4,000 mL by mouth once for 1 dose, Disp: 4000 mL, Rfl: 0  •  pregabalin (Lyrica) 100 mg capsule, Take 1 capsule (100 mg total) by mouth 3 (three) times a day, Disp: 90 capsule, Rfl: 1    ALLERGIES:  Allergies   Allergen Reactions   • Penicillins Anaphylaxis       REVIEW OF SYSTEMS:  Pertinent items are noted in HPI      LABS:  HgA1c:   Lab Results   Component Value Date    HGBA1C 5 9 (H) 10/27/2022     BMP:   Lab Results   Component Value Date    BUN 17 02/07/2023    CALCIUM 9 3 02/07/2023     02/07/2023    CO2 25 02/07/2023    CREATININE 1 43 (H) 02/07/2023    K 3 8 02/07/2023       _____________________________________________________  PHYSICAL EXAMINATION:  Vital signs: /91   Pulse (!) 109   Ht 5' 10\" (1 778 m)   Wt 94 9 kg (209 lb 3 2 oz)   BMI 30 02 kg/m²   General: well developed " and well nourished, alert, oriented times 3 and appears comfortable  Psychiatric: Normal  HEENT: Trachea Midline, No torticollis  Cardiovascular: No discernable arrhythmia  Pulmonary: No wheezing or stridor  Abdomen: No rebound or guarding  Extremities: No peripheral edema  Skin: No masses, erythema, lacerations, fluctation, ulcerations  Neurovascular: Sensation intact to the Ulnar Nerve, Sensation Intact to the Radial Nerve, Decreased Sensation to  the Median Nerve, Motor Intact to the Median, Ulnar, Radial Nerve and Pulses Intact    MUSCULOSKELETAL EXAMINATION:  RIGHT SIDE:  CMC: Positive grind and Positive tendnerness CMC with positive shuck, crepitation and no significant hyperextension at the metacarpal phalangeal joint or instability noted    Carpal tunnel:  Postive Tinel's, Positive flexion compression test nd Positive Phalan's Test   Weakness abductor pollicis brevis graded as 4/5, intrinsics 5/5, anterior interosseous nerve V/V       _____________________________________________________  STUDIES REVIEWED:  Images and EMG were reviewed in PACS by Dr Brad Zeng demonstrating carpal tunnel syndrome to the right hand    PROCEDURES PERFORMED:  Procedures  No Procedures performed today

## 2023-05-24 NOTE — LETTER
May 25, 2023     Stephanie Deshpande, [address]    Patient: Ollie Olson   YOB: 1965   Date of Visit: 5/24/2023       Dear Dr Bronwyn Singh: Thank you for referring Kamlesh Aguirre to me for evaluation  Below are my notes for this consultation  If you have questions, please do not hesitate to call me  I look forward to following your patient along with you  Sincerely,        Andrade Bo MD        CC: No Recipients    Andrade Bo MD  5/24/2023  5:40 PM  Signed  ASSESSMENT/PLAN:    Assessment:   Carpal Tunnel Syndrome  right and Thumb CMC Arthritis  right     Plan:   Endoscopic Carpal Tunnel Release  right under sedation    Follow Up: After Surgery    To Do Next Visit:    and Sutures out    General Discussions:     [facility] Arthritis: The anatomy and physiology of carpometacarpal joint arthritis was discussed with the patient today in the office  Deterioration of the articular cartilage eventually leads to hypermobility at the thumb [facility] joint, resulting in joint subluxation, osteophyte formation, cystic changes within the trapezium and base of the first metacarpal, as well as subchondral sclerosis  Eventually, pain, limited mobility, and compensatory hyperextension at the metacarpophalangeal joint may develop  While normal activity and usage of the thumb joint may provide a painful experience to the patient, this typically does not result in damage to the thumb or hand  Treatment options include resting thumb spica splints to decreased joint edema, pain, and inflammation  Therapy exercises to strengthen the thenar musculature may relieve pain, but do not alter the overall continued development of osteoarthritis  Oral medications, topical medications, corticosteroid injections may decrease pain and increase overall function  Eventually, approximately 5% of patients may require surgical intervention  Carpal Tunnel Syndrome:  The anatomy and physiology of carpal tunnel syndrome was discussed with the patient today  Increase pressure localized under the transverse carpal ligament can cause pain, numbness, tingling, or dysesthesias within the median nerve distribution as well as feelings of fatigue, clumsiness, or awkwardness  These symptoms typically occur at night and worse in the morning upon waking  Eventually, untreated carpal tunnel syndrome can result in weakness and permanent loss of muscle within the thenar compartment of the hand  Treatment options were discussed with the patient  Conservative treatment includes nocturnal resting splints to keep the nerve in a neutral position, ergonomic changes within the work or home environment, activity modification, and tendon gliding exercises  Steroid injections within the carpal canal can help a majority of patients, however this is often self-limited in a majority of patients  Surgical intervention to divide the transverse carpal ligament typically results in a long-lasting relief of the patient's complaints, with the recurrence rate of less than 1%  Operative Discussions:     Endoscopic Carpal Tunnel Release: The anatomy and physiology of carpal tunnel syndrome was discussed with the patient today  Increase pressure localized under the transverse carpal ligament can cause pain, numbness, tingling, or dysesthesias within the median nerve distribution as well as feelings of fatigue, clumsiness, or awkwardness  These symptoms typically occur at night and worse in the morning upon waking  Eventually, untreated carpal tunnel syndrome can result in weakness and permanent loss of muscle within the thenar compartment of the hand  Treatment options were discussed with the patient    Conservative treatment includes nocturnal resting splints to keep the nerve in a neutral position, ergonomic changes within the work or home environment, activity modification, and tendon gliding exercises  Steroid injections within the carpal canal can help a majority of patients, however this is often self-limited in a majority of patients  Surgical intervention to divide the transverse carpal ligament typically results in a long-lasting relief of the patient's complaints, with the recurrence rate of less than 1%  The patient has elected to undergo an endoscopic carpal tunnel release  The 2 incision technique was discussed with the patient, which results in approximately a two-week less recovery time, and less wound complications  In the postoperative period, light activities are allowed immediately, driving is allowed when narcotic medication has stopped, and the bandages may be removed and incision may get wet after 2 days  Heavy activities (lifting more than approximately 10 pounds) will be allowed after follow up appointment in 1-2 weeks  While the pain and discomfort in the hands generally improves rapidly, the numbness and tingling as well as the strength will slowly improve over weeks to months depending on the severity of the carpal tunnel syndrome  Pillar pain was discussed with the patient, which is typically a common but self-limiting condition  The risks of bleeding and infection from the surgery are less than 1%  Risk of recurrence is approximately 0 5%  The risks of nerve injury or nerve damage or damage to the blood vessels is approximately 1 in 1200  The patient has an understanding of the above mentioned discussion  The risks and benefits of the procedure were explained to the patient, which include, but are not limited to: Bleeding, infection, recurrence, pain, scar, damage to tendons, damage to nerves, and damage to blood vessels, failure to give desired results and complications related to anesthesia  These risks, along with alternative conservative treatment options, and postoperative protocols were voiced back and understood by the patient    All questions were answered to "the patient's satisfaction  The patient agrees to comply with a standard postoperative protocol, and is willing to proceed  Education was provided via written and auditory forms  There were no barriers to learning  Written handouts regarding wound care, incision and scar care, and general preoperative information was provided to the patient  Prior to surgery, the patient may be requested to stop all anti-inflammatory medications  Prophylactic aspirin, Plavix, and Coumadin may be allowed to be continued  Medications including vitamin E , ginkgo, and fish oil are requested to be stopped approximately one week prior to surgery  Hypertensive medications and beta blockers, if taken, should be continued  _____________________________________________________  CHIEF COMPLAINT:  Chief Complaint   Patient presents with   • Right Wrist - Numbness     EMG 12/15/22 in procedures          SUBJECTIVE:  Bryant Connolly is a 65 y o  male who presents with a one year history of pain, numbness, and tingling of his right hand  Patient states that prolonged use of his hand leads to a \"cramping\" pain and causes his hand to begin shaking  He recently stopped working at his job as a  due to the pain, numbness, and weakness he experiences with continuous use of his dominant R hand  Patient underwent R UE EMG on 11/11/2022 which demonstrated findings consistent with CTS in the R wrist and hand  Patient stated his pain is most severe at the base of his thumb, pointed towards CMCJ of R thumb  PAST MEDICAL HISTORY:  Past Medical History:   Diagnosis Date   • Anxiety    • Depression    • GERD (gastroesophageal reflux disease)    • Mood swings    • Rheumatic fever     age 7       PAST SURGICAL HISTORY:  History reviewed  No pertinent surgical history      FAMILY HISTORY:  Family History   Problem Relation Age of Onset   • Diabetes Mother    • Kidney disease Mother    • Heart disease Father    • Leukemia Father 68       SOCIAL " HISTORY:  Social History     Tobacco Use   • Smoking status: Every Day     Packs/day: 0 03     Years: 34 00     Total pack years: 1 02     Types: Cigarettes     Start date: 1989   • Smokeless tobacco: Never   • Tobacco comments:     3-4 cigarettes day   Vaping Use   • Vaping Use: Never used   Substance Use Topics   • Alcohol use:  Yes     Alcohol/week: 6 0 standard drinks of alcohol     Types: 6 Cans of beer per week     Comment: weekends   • Drug use: Never       MEDICATIONS:    Current Outpatient Medications:   •  bisacodyl (DULCOLAX) 5 mg EC tablet, Take 1 tablet (5 mg total) by mouth once for 1 dose, Disp: 2 tablet, Rfl: 0  •  cyclobenzaprine (FLEXERIL) 10 mg tablet, Take 1 tablet (10 mg total) by mouth daily at bedtime, Disp: 30 tablet, Rfl: 1  •  Diclofenac Sodium (VOLTAREN) 1 %, Apply 2 g topically 2 (two) times a day, Disp: 150 g, Rfl: 1  •  diclofenac sodium (VOLTAREN) 50 mg EC tablet, Take 1 tablet (50 mg total) by mouth 2 (two) times a day, Disp: 60 tablet, Rfl: 1  •  doxepin (SINEquan) 25 mg capsule, Take 25 mg by mouth daily at bedtime, Disp: , Rfl:   •  DULoxetine (CYMBALTA) 30 mg delayed release capsule, Take 30 mg by mouth daily, Disp: , Rfl:   •  FLUoxetine (PROzac) 20 mg capsule, Take 20 mg by mouth daily, Disp: , Rfl:   •  HYDROcodone-acetaminophen (Norco) 5-325 mg per tablet, Take 1 tablet by mouth every 6 (six) hours as needed for pain Max Daily Amount: 4 tablets, Disp: 60 tablet, Rfl: 0  •  lurasidone (LATUDA) 40 mg tablet, Take 40 mg by mouth daily with breakfast, Disp: , Rfl:   •  pantoprazole (PROTONIX) 40 mg tablet, Take 1 tablet (40 mg total) by mouth daily, Disp: 30 tablet, Rfl: 5  •  polyethylene glycol (GOLYTELY) 4000 mL solution, Take 4,000 mL by mouth once for 1 dose, Disp: 4000 mL, Rfl: 0  •  pregabalin (Lyrica) 100 mg capsule, Take 1 capsule (100 mg total) by mouth 3 (three) times a day, Disp: 90 capsule, Rfl: 1    ALLERGIES:  Allergies   Allergen Reactions   • Penicillins "Anaphylaxis       REVIEW OF SYSTEMS:  Pertinent items are noted in HPI  LABS:  HgA1c:   Lab Results   Component Value Date    HGBA1C 5 9 (H) 10/27/2022     BMP:   Lab Results   Component Value Date    BUN 17 02/07/2023    CALCIUM 9 3 02/07/2023     02/07/2023    CO2 25 02/07/2023    CREATININE 1 43 (H) 02/07/2023    K 3 8 02/07/2023       _____________________________________________________  PHYSICAL EXAMINATION:  Vital signs: /91   Pulse (!) 109   Ht 5' 10\" (1 778 m)   Wt 94 9 kg (209 lb 3 2 oz)   BMI 30 02 kg/m²   General: well developed and well nourished, alert, oriented times 3 and appears comfortable  Psychiatric: Normal  HEENT: Trachea Midline, No torticollis  Cardiovascular: No discernable arrhythmia  Pulmonary: No wheezing or stridor  Abdomen: No rebound or guarding  Extremities: No peripheral edema  Skin: No masses, erythema, lacerations, fluctation, ulcerations  Neurovascular: Sensation intact to the Ulnar Nerve, Sensation Intact to the Radial Nerve, Decreased Sensation to  the Median Nerve, Motor Intact to the Median, Ulnar, Radial Nerve and Pulses Intact    MUSCULOSKELETAL EXAMINATION:  RIGHT SIDE:  CMC: Positive grind and Positive tendnerness CMC with positive shuck, crepitation and no significant hyperextension at the metacarpal phalangeal joint or instability noted    Carpal tunnel:  Postive Tinel's, Positive flexion compression test nd Positive Phalan's Test   Weakness abductor pollicis brevis graded as 4/5, intrinsics 5/5, anterior interosseous nerve V/V       _____________________________________________________  STUDIES REVIEWED:  Images and EMG were reviewed in PACS by Dr Mickey Sam demonstrating carpal tunnel syndrome to the right hand    PROCEDURES PERFORMED:  Procedures  No Procedures performed today    "

## 2023-07-06 ENCOUNTER — ANESTHESIA (OUTPATIENT)
Dept: GASTROENTEROLOGY | Facility: AMBULARY SURGERY CENTER | Age: 58
End: 2023-07-06

## 2023-07-06 ENCOUNTER — ANESTHESIA EVENT (OUTPATIENT)
Dept: GASTROENTEROLOGY | Facility: AMBULARY SURGERY CENTER | Age: 58
End: 2023-07-06

## 2023-07-06 ENCOUNTER — HOSPITAL ENCOUNTER (OUTPATIENT)
Dept: GASTROENTEROLOGY | Facility: AMBULARY SURGERY CENTER | Age: 58
Setting detail: OUTPATIENT SURGERY
End: 2023-07-06
Attending: INTERNAL MEDICINE
Payer: COMMERCIAL

## 2023-07-06 VITALS
WEIGHT: 203 LBS | HEIGHT: 70 IN | OXYGEN SATURATION: 98 % | SYSTOLIC BLOOD PRESSURE: 146 MMHG | HEART RATE: 83 BPM | DIASTOLIC BLOOD PRESSURE: 84 MMHG | TEMPERATURE: 97.1 F | BODY MASS INDEX: 29.06 KG/M2 | RESPIRATION RATE: 18 BRPM

## 2023-07-06 DIAGNOSIS — K21.9 GASTROESOPHAGEAL REFLUX DISEASE, UNSPECIFIED WHETHER ESOPHAGITIS PRESENT: ICD-10-CM

## 2023-07-06 DIAGNOSIS — Z12.11 ENCOUNTER FOR SCREENING FOR MALIGNANT NEOPLASM OF COLON: ICD-10-CM

## 2023-07-06 PROCEDURE — 43239 EGD BIOPSY SINGLE/MULTIPLE: CPT | Performed by: INTERNAL MEDICINE

## 2023-07-06 PROCEDURE — 45385 COLONOSCOPY W/LESION REMOVAL: CPT | Performed by: INTERNAL MEDICINE

## 2023-07-06 PROCEDURE — 88305 TISSUE EXAM BY PATHOLOGIST: CPT | Performed by: STUDENT IN AN ORGANIZED HEALTH CARE EDUCATION/TRAINING PROGRAM

## 2023-07-06 RX ORDER — SODIUM CHLORIDE, SODIUM LACTATE, POTASSIUM CHLORIDE, CALCIUM CHLORIDE 600; 310; 30; 20 MG/100ML; MG/100ML; MG/100ML; MG/100ML
INJECTION, SOLUTION INTRAVENOUS CONTINUOUS PRN
Status: DISCONTINUED | OUTPATIENT
Start: 2023-07-06 | End: 2023-07-21

## 2023-07-06 RX ORDER — PROPOFOL 10 MG/ML
INJECTION, EMULSION INTRAVENOUS AS NEEDED
Status: DISCONTINUED | OUTPATIENT
Start: 2023-07-06 | End: 2023-07-21

## 2023-07-06 RX ADMIN — SODIUM CHLORIDE, SODIUM LACTATE, POTASSIUM CHLORIDE, AND CALCIUM CHLORIDE: .6; .31; .03; .02 INJECTION, SOLUTION INTRAVENOUS at 12:25

## 2023-07-06 RX ADMIN — PROPOFOL 50 MG: 10 INJECTION, EMULSION INTRAVENOUS at 12:30

## 2023-07-06 RX ADMIN — PROPOFOL 30 MG: 10 INJECTION, EMULSION INTRAVENOUS at 12:44

## 2023-07-06 RX ADMIN — PROPOFOL 150 MG: 10 INJECTION, EMULSION INTRAVENOUS at 12:29

## 2023-07-06 RX ADMIN — PROPOFOL 30 MG: 10 INJECTION, EMULSION INTRAVENOUS at 12:47

## 2023-07-06 RX ADMIN — PROPOFOL 30 MG: 10 INJECTION, EMULSION INTRAVENOUS at 12:50

## 2023-07-06 NOTE — H&P
History and Physical - [facility]  Zoe Mitchell Colon 65 y.o. male MRN: [id number]                  HPI: Yosef Whitmore is a 65y.o. year old male who presents for EGD/colon      REVIEW OF SYSTEMS: Per the HPI, and otherwise unremarkable. Historical Information   Past Medical History:   Diagnosis Date   • Anxiety    • Depression    • GERD (gastroesophageal reflux disease)    • Mood swings    • Rheumatic fever     age 7     History reviewed. No pertinent surgical history.   Social History   Social History     Substance and Sexual Activity   Alcohol Use Yes   • Alcohol/week: 6.0 standard drinks of alcohol   • Types: 6 Cans of beer per week    Comment: weekends     Social History     Substance and Sexual Activity   Drug Use Never     Social History     Tobacco Use   Smoking Status Every Day   • Packs/day: 0.03   • Years: 34.00   • Total pack years: 1.02   • Types: Cigarettes   • Start date: 1989   Smokeless Tobacco Never   Tobacco Comments    3-4 cigarettes day     Family History   Problem Relation Age of Onset   • Diabetes Mother    • Kidney disease Mother    • Heart disease Father    • Leukemia Father 68   • Colon cancer Father        Meds/Allergies       Current Outpatient Medications:   •  cyclobenzaprine (FLEXERIL) 10 mg tablet  •  doxepin (SINEquan) 25 mg capsule  •  DULoxetine (CYMBALTA) 30 mg delayed release capsule  •  HYDROcodone-acetaminophen (Norco) 5-325 mg per tablet  •  pantoprazole (PROTONIX) 40 mg tablet  •  pregabalin (Lyrica) 100 mg capsule  •  Diclofenac Sodium (VOLTAREN) 1 %  •  diclofenac sodium (VOLTAREN) 50 mg EC tablet  •  FLUoxetine (PROzac) 20 mg capsule  •  lurasidone (LATUDA) 40 mg tablet    Allergies   Allergen Reactions   • Penicillins Anaphylaxis       Objective     /77   Pulse 89   Temp (!) 97.2 °F (36.2 °C) (Temporal)   Resp 18   Ht 5' 10" (1.778 m)   Wt 92.1 kg (203 lb)   SpO2 98%   BMI 29.13 kg/m²       PHYSICAL EXAM    Gen: NAD  Head: NCAT  CV: RRR  CHEST: Clear  ABD: soft, NT/ND  EXT: no edema      ASSESSMENT/PLAN:  This is a 65y.o. year old male here for EGD/colon, and he is stable and optimized for his procedure.

## 2023-07-06 NOTE — ANESTHESIA POSTPROCEDURE EVALUATION
Post-Op Assessment Note    CV Status:  Stable  Pain Score: 0    Pain management: adequate     Mental Status:  Alert and awake   Hydration Status:  Euvolemic   PONV Controlled:  Controlled   Airway Patency:  Patent      Post Op Vitals Reviewed: Yes      Staff: CRNA         There were no known notable events for this encounter.     /65 (07/06/23 1258)    Temp (!) 97.1 °F (36.2 °C) (07/06/23 1258)    Pulse 90 (07/06/23 1258)   Resp 20 (07/06/23 1258)    SpO2 95 % (07/06/23 1258)

## 2023-07-06 NOTE — ANESTHESIA PREPROCEDURE EVALUATION
Procedure:  COLONOSCOPY  EGD    Relevant Problems   GI/HEPATIC   (+) Gastroesophageal reflux disease with esophagitis without hemorrhage      NEURO/PSYCH   (+) Current severe episode of major depressive disorder without psychotic features without prior episode [facility])        Physical Exam    Airway    Mallampati score: II  TM Distance: >3 FB  Neck ROM: full     Dental       Cardiovascular      Pulmonary      Other Findings        Anesthesia Plan  ASA Score- 2     Anesthesia Type- IV sedation with anesthesia with ASA Monitors. Additional Monitors:   Airway Plan:           Plan Factors-Exercise tolerance (METS): >4 METS. Chart reviewed. Existing labs reviewed. Patient summary reviewed. Induction- intravenous. Postoperative Plan-     Informed Consent- Anesthetic plan and risks discussed with patient. I personally reviewed this patient with the CRNA. Discussed and agreed on the Anesthesia Plan with the CRNA. Daisy Hernandez

## 2023-07-10 ENCOUNTER — TELEPHONE (OUTPATIENT)
Dept: GASTROENTEROLOGY | Facility: CLINIC | Age: 58
End: 2023-07-10

## 2023-07-10 PROCEDURE — 88305 TISSUE EXAM BY PATHOLOGIST: CPT | Performed by: STUDENT IN AN ORGANIZED HEALTH CARE EDUCATION/TRAINING PROGRAM

## 2023-07-10 NOTE — TELEPHONE ENCOUNTER
----- Message from Kev Neal MD sent at 7/10/2023  1:27 PM EDT -----  Inflammatory polyp on pathology (non cancerous); repeat colonoscopy in1 year

## 2023-07-21 NOTE — PRE-PROCEDURE INSTRUCTIONS
Pre-Surgery Instructions:   Medication Instructions   • cyclobenzaprine (FLEXERIL) 10 mg tablet Take night before surgery   • Diclofenac Sodium (VOLTAREN) 1 % Hold day of surgery. • diclofenac sodium (VOLTAREN) 50 mg EC tablet Stop taking 3 days prior to surgery. • doxepin (SINEquan) 25 mg capsule Take night before surgery   • DULoxetine (CYMBALTA) 30 mg delayed release capsule Take day of surgery. • FLUoxetine (PROzac) 20 mg capsule Take day of surgery. • HYDROcodone-acetaminophen (Norco) 5-325 mg per tablet Uses PRN- OK to take day of surgery   • lurasidone (LATUDA) 40 mg tablet Take day of surgery. • pantoprazole (PROTONIX) 40 mg tablet Take day of surgery. • pregabalin (Lyrica) 100 mg capsule Take day of surgery. Medication instructions for day surgery reviewed. Please use only a sip of water to take your instructed medications. Avoid all over the counter vitamins, supplements and NSAIDS for one week prior to surgery per anesthesia guidelines. Tylenol is ok to take as needed. You will receive a call one business day prior to surgery with an arrival time and hospital directions. If your surgery is scheduled on a Monday, the hospital will be calling you on the Friday prior to your surgery. If you have not heard from anyone by 8pm, please call the hospital supervisor through the hospital  at [phone]. Natalia Gomez [phone]). Do not eat or drink anything after midnight the night before your surgery, including candy, mints, lifesavers, or chewing gum. Do not drink alcohol 24hrs before your surgery. Try not to smoke at least 24hrs before your surgery. Follow the pre surgery showering instructions as listed in the [facility] Surgical Experience Booklet” or otherwise provided by your surgeon's office. Do not shave the surgical area 24 hours before surgery. Do not apply any lotions, creams, including makeup, cologne, deodorant, or perfumes after showering on the day of your surgery. No contact lenses, eye make-up, or artificial eyelashes. Remove nail polish, including gel polish, and any artificial, gel, or acrylic nails if possible. Remove all jewelry including rings and body piercing jewelry. Wear causal clothing that is easy to take on and off. Consider your type of surgery. Keep any valuables, jewelry, piercings at home. Please bring any specially ordered equipment (sling, braces) if indicated. Arrange for a responsible person to drive you to and from the hospital on the day of your surgery. Visitor Guidelines discussed. Call the surgeon's office with any new illnesses, exposures, or additional questions prior to surgery. Please reference your [facility] Surgical Experience Booklet” for additional information to prepare for your upcoming surgery.

## 2023-07-25 ENCOUNTER — HOSPITAL ENCOUNTER (OUTPATIENT)
Facility: HOSPITAL | Age: 58
Setting detail: OUTPATIENT SURGERY
Discharge: HOME | End: 2023-07-25
Attending: ORTHOPAEDIC SURGERY | Admitting: ORTHOPAEDIC SURGERY
Payer: COMMERCIAL

## 2023-07-25 ENCOUNTER — ANESTHESIA (OUTPATIENT)
Dept: PERIOP | Facility: HOSPITAL | Age: 58
End: 2023-07-25
Payer: COMMERCIAL

## 2023-07-25 ENCOUNTER — ANESTHESIA EVENT (OUTPATIENT)
Dept: PERIOP | Facility: HOSPITAL | Age: 58
End: 2023-07-25
Payer: COMMERCIAL

## 2023-07-25 VITALS
TEMPERATURE: 97.2 F | WEIGHT: 209 LBS | RESPIRATION RATE: 17 BRPM | HEART RATE: 80 BPM | SYSTOLIC BLOOD PRESSURE: 131 MMHG | DIASTOLIC BLOOD PRESSURE: 69 MMHG | BODY MASS INDEX: 29.92 KG/M2 | HEIGHT: 70 IN | OXYGEN SATURATION: 99 %

## 2023-07-25 DIAGNOSIS — G56.01 CARPAL TUNNEL SYNDROME OF RIGHT WRIST: Primary | ICD-10-CM

## 2023-07-25 PROCEDURE — NC001 PR NO CHARGE: Performed by: ORTHOPAEDIC SURGERY

## 2023-07-25 PROCEDURE — 29848 WRIST ENDOSCOPY/SURGERY: CPT | Performed by: ORTHOPAEDIC SURGERY

## 2023-07-25 RX ORDER — FENTANYL CITRATE 50 UG/ML
INJECTION, SOLUTION INTRAMUSCULAR; INTRAVENOUS AS NEEDED
Status: DISCONTINUED | OUTPATIENT
Start: 2023-07-25 | End: 2023-07-25

## 2023-07-25 RX ORDER — ONDANSETRON 2 MG/ML
4 INJECTION INTRAMUSCULAR; INTRAVENOUS ONCE AS NEEDED
Status: DISCONTINUED | OUTPATIENT
Start: 2023-07-25 | End: 2023-07-25 | Stop reason: HOSPADM

## 2023-07-25 RX ORDER — KETOROLAC TROMETHAMINE 30 MG/ML
INJECTION, SOLUTION INTRAMUSCULAR; INTRAVENOUS AS NEEDED
Status: DISCONTINUED | OUTPATIENT
Start: 2023-07-25 | End: 2023-07-25

## 2023-07-25 RX ORDER — TRAMADOL HYDROCHLORIDE 50 MG/1
50 TABLET ORAL EVERY 6 HOURS PRN
Qty: 5 TABLET | Refills: 0 | Status: SHIPPED | OUTPATIENT
Start: 2023-07-25 | End: 2023-07-27 | Stop reason: SDUPTHER

## 2023-07-25 RX ORDER — SENNOSIDES 8.6 MG
650 CAPSULE ORAL EVERY 8 HOURS PRN
Qty: 30 TABLET | Refills: 0 | Status: SHIPPED | OUTPATIENT
Start: 2023-07-25

## 2023-07-25 RX ORDER — SODIUM CHLORIDE, SODIUM LACTATE, POTASSIUM CHLORIDE, CALCIUM CHLORIDE 600; 310; 30; 20 MG/100ML; MG/100ML; MG/100ML; MG/100ML
20 INJECTION, SOLUTION INTRAVENOUS CONTINUOUS
Status: DISCONTINUED | OUTPATIENT
Start: 2023-07-25 | End: 2023-07-25 | Stop reason: HOSPADM

## 2023-07-25 RX ORDER — CLINDAMYCIN PHOSPHATE 900 MG/50ML
900 INJECTION INTRAVENOUS ONCE
Status: COMPLETED | OUTPATIENT
Start: 2023-07-25 | End: 2023-07-25

## 2023-07-25 RX ORDER — NAPROXEN SODIUM 220 MG
220 TABLET ORAL 2 TIMES DAILY WITH MEALS
Qty: 20 TABLET | Refills: 0 | Status: SHIPPED | OUTPATIENT
Start: 2023-07-25 | End: 2023-07-27 | Stop reason: ALTCHOICE

## 2023-07-25 RX ORDER — MIDAZOLAM HYDROCHLORIDE 2 MG/2ML
INJECTION, SOLUTION INTRAMUSCULAR; INTRAVENOUS AS NEEDED
Status: DISCONTINUED | OUTPATIENT
Start: 2023-07-25 | End: 2023-07-25

## 2023-07-25 RX ORDER — PROPOFOL 10 MG/ML
INJECTION, EMULSION INTRAVENOUS AS NEEDED
Status: DISCONTINUED | OUTPATIENT
Start: 2023-07-25 | End: 2023-07-25

## 2023-07-25 RX ORDER — LIDOCAINE HYDROCHLORIDE 10 MG/ML
INJECTION, SOLUTION EPIDURAL; INFILTRATION; INTRACAUDAL; PERINEURAL AS NEEDED
Status: DISCONTINUED | OUTPATIENT
Start: 2023-07-25 | End: 2023-07-25

## 2023-07-25 RX ORDER — FENTANYL CITRATE/PF 50 MCG/ML
25 SYRINGE (ML) INJECTION
Status: DISCONTINUED | OUTPATIENT
Start: 2023-07-25 | End: 2023-07-25 | Stop reason: HOSPADM

## 2023-07-25 RX ORDER — SODIUM CHLORIDE, SODIUM LACTATE, POTASSIUM CHLORIDE, CALCIUM CHLORIDE 600; 310; 30; 20 MG/100ML; MG/100ML; MG/100ML; MG/100ML
INJECTION, SOLUTION INTRAVENOUS CONTINUOUS PRN
Status: DISCONTINUED | OUTPATIENT
Start: 2023-07-25 | End: 2023-07-25

## 2023-07-25 RX ORDER — MAGNESIUM HYDROXIDE 1200 MG/15ML
LIQUID ORAL AS NEEDED
Status: DISCONTINUED | OUTPATIENT
Start: 2023-07-25 | End: 2023-07-25 | Stop reason: HOSPADM

## 2023-07-25 RX ORDER — PROPOFOL 10 MG/ML
INJECTION, EMULSION INTRAVENOUS CONTINUOUS PRN
Status: DISCONTINUED | OUTPATIENT
Start: 2023-07-25 | End: 2023-07-25

## 2023-07-25 RX ADMIN — CLINDAMYCIN PHOSPHATE 900 MG: 900 INJECTION, SOLUTION INTRAVENOUS at 11:00

## 2023-07-25 RX ADMIN — SODIUM CHLORIDE, SODIUM LACTATE, POTASSIUM CHLORIDE, AND CALCIUM CHLORIDE 20 ML/HR: .6; .31; .03; .02 INJECTION, SOLUTION INTRAVENOUS at 09:12

## 2023-07-25 RX ADMIN — MIDAZOLAM 2 MG: 1 INJECTION INTRAMUSCULAR; INTRAVENOUS at 10:55

## 2023-07-25 RX ADMIN — PROPOFOL 70 MG: 10 INJECTION, EMULSION INTRAVENOUS at 10:59

## 2023-07-25 RX ADMIN — KETOROLAC TROMETHAMINE 30 MG: 30 INJECTION, SOLUTION INTRAMUSCULAR; INTRAVENOUS at 11:24

## 2023-07-25 RX ADMIN — PROPOFOL 100 MCG/KG/MIN: 10 INJECTION, EMULSION INTRAVENOUS at 10:59

## 2023-07-25 RX ADMIN — FENTANYL CITRATE 25 MCG: 50 INJECTION, SOLUTION INTRAMUSCULAR; INTRAVENOUS at 10:59

## 2023-07-25 RX ADMIN — LIDOCAINE HYDROCHLORIDE 50 MG: 10 INJECTION, SOLUTION EPIDURAL; INFILTRATION; INTRACAUDAL; PERINEURAL at 10:59

## 2023-07-25 RX ADMIN — SODIUM CHLORIDE, SODIUM LACTATE, POTASSIUM CHLORIDE, AND CALCIUM CHLORIDE: .6; .31; .03; .02 INJECTION, SOLUTION INTRAVENOUS at 10:55

## 2023-07-25 NOTE — DISCHARGE INSTR - AVS FIRST PAGE
Post Operative Instructions    You have had surgery on your arm today, please read and follow the information below:  Elevate your hand above your elbow during the next 24-48 hours to help with swelling. Place your hand and arm over your head with motion at your shoulder three times a day. Do not apply any cream/ointment/oil to your incisions including antibiotics. Do not soak your hands in standing water (dishwater, tubs, Jacuzzi's, pools, etc.) until given permission (typically 2-3 weeks after injury)    Call the office if you notice any:  Increased numbness or tingling of your hand or fingers that is not relieved with elevation. Increasing pain that is not controlled with medication. Difficulty chewing, breathing, swallowing. Chest pains or shortness of breath. Fever over 101.4 degrees. Bandage: Remove bandage after 5 days. Motion: Move fingers into a fist 5 times a day, DO NOT move any splinted fingers. Weight bearing status: Avoid heavy lifting (>5 pounds) with the extremity that was operated on until follow up appointment. Normal activities of daily living are OK. Ice: Ice for 10 minutes every hour as needed for swelling x 24 hours. Sling: No sling necessary. Medications:   Naproxen 220 mg two times a day   Tylenol Extended Release 650 mg every 8 hours  Tramadol 1 tab every 6 hours AS needed for pain     Follow-up Appointment: 7-10 days. Please call the office if you have any questions or concerns regarding your post-operative care.

## 2023-07-25 NOTE — ANESTHESIA PREPROCEDURE EVALUATION
Procedure:  RELEASE CARPAL TUNNEL ENDOSCOPIC right (Right: Wrist)    Relevant Problems   ANESTHESIA (within normal limits)      CARDIO (within normal limits)      ENDO (within normal limits)      GI/HEPATIC   (+) Gastroesophageal reflux disease with esophagitis without hemorrhage      /RENAL (within normal limits)      GYN (within normal limits)      HEMATOLOGY (within normal limits)      MUSCULOSKELETAL (within normal limits)      NEURO/PSYCH   (+) Current severe episode of major depressive disorder without psychotic features without prior episode (HCC)      PULMONARY (within normal limits)      Nervous and Auditory   (+) Carpal tunnel syndrome of right wrist      Other   (+) Muscle spasms of both lower extremities      Lab Results   Component Value Date    WBC 6.99 02/07/2023    HGB 15.7 02/07/2023    HCT 47.2 02/07/2023    MCV 92 02/07/2023     02/07/2023     Lab Results   Component Value Date    SODIUM 138 02/07/2023    K 3.8 02/07/2023     02/07/2023    CO2 25 02/07/2023    AGAP 5 02/07/2023    BUN 17 02/07/2023    CREATININE 1.43 (H) 02/07/2023    GLUC 154 (H) 02/07/2023    CALCIUM 9.3 02/07/2023    AST 24 02/07/2023    ALT 34 02/07/2023    ALKPHOS 52 02/07/2023    TP 7.6 02/07/2023    TBILI 0.46 02/07/2023    EGFR 54 02/07/2023     No results found for: "PTT"  No results found for: "INR", "PROTIME"      Physical Exam    Airway    Mallampati score: II  TM Distance: >3 FB  Neck ROM: full     Dental   No notable dental hx     Cardiovascular  Rhythm: regular, Rate: normal, Cardiovascular exam normal    Pulmonary  Pulmonary exam normal Breath sounds clear to auscultation,     Other Findings        Anesthesia Plan  ASA Score- 2     Anesthesia Type- IV sedation with anesthesia with ASA Monitors. Additional Monitors:   Airway Plan:           Plan Factors-Exercise tolerance (METS): >4 METS. Chart reviewed. EKG reviewed. Existing labs reviewed. Patient summary reviewed.     Patient is not a current smoker. Patient did not smoke on day of surgery. Obstructive sleep apnea risk education given perioperatively. Induction- intravenous. Postoperative Plan- Plan for postoperative opioid use. Informed Consent- Anesthetic plan and risks discussed with patient. I personally reviewed this patient with the CRNA. Discussed and agreed on the Anesthesia Plan with the CRNA. Francoise Shepard

## 2023-07-25 NOTE — H&P
H&P Exam - Orthopedics   Jorge Harp Colon 65 y.o. male MRN: [id number]  Unit/Bed#: [facility] 10    Assessment/Plan   Assessment:  Right Carpal tunnel syndrome  Plan:  Right endoscopic carpal tunnel release    History of Present Illness   HPI:  Renato Garcia is a 65 y.o. male who presents with numbness and tingling in the right hand. Patient has not improved with non-operative modalities. He would like to proceed with surgery. Historical Information  Review Of Systems:   · Skin: Normal  · Neuro: See HPI  · Musculoskeletal: See HPI  · 14 point review of systems negative except as stated above     Past Medical History:   Past Medical History:   Diagnosis Date   • Anxiety    • Depression    • GERD (gastroesophageal reflux disease)    • Mood swings    • Rheumatic fever     age 7       Past Surgical History:   Past Surgical History:   Procedure Laterality Date   • COLONOSCOPY     • EGD         Family History:  Family history reviewed and non-contributory  Family History   Problem Relation Age of Onset   • Diabetes Mother    • Kidney disease Mother    • Heart disease Father    • Leukemia Father 68   • Colon cancer Father        Social History:  Social History     Socioeconomic History   • Marital status: Single     Spouse name: None   • Number of children: None   • Years of education: None   • Highest education level: None   Occupational History   • None   Tobacco Use   • Smoking status: Every Day     Packs/day: 0.03     Years: 34.00     Total pack years: 1.02     Types: Cigarettes     Start date: 1989   • Smokeless tobacco: Never   • Tobacco comments:     3-4 cigarettes day   Vaping Use   • Vaping Use: Never used   Substance and Sexual Activity   • Alcohol use:  Yes     Alcohol/week: 6.0 standard drinks of alcohol     Types: 6 Cans of beer per week     Comment: weekends   • Drug use: Never   • Sexual activity: None   Other Topics Concern   • None   Social History Narrative   • None     Social Determinants of Health Financial Resource Strain: Not on file   Food Insecurity: Not on file   Transportation Needs: Not on file   Physical Activity: Not on file   Stress: Not on file   Social Connections: Not on file   Intimate Partner Violence: Not on file   Housing Stability: Not on file       Allergies: Allergies   Allergen Reactions   • Penicillins Anaphylaxis           Labs:  0   Lab Value Date/Time    HCT 47.2 02/07/2023 1220    HCT 47.1 10/07/2019 0705    HCT 44.7 06/11/2019 0906    HGB 15.7 02/07/2023 1220    HGB 16.3 10/07/2019 0705    HGB 15.3 06/11/2019 0906    WBC 6.99 02/07/2023 1220    WBC 6.83 10/07/2019 0705    WBC 6.27 06/11/2019 0906    ESR 25 (H) 11/11/2022 1331       Meds:    Current Facility-Administered Medications:   •  clindamycin (CLEOCIN) IVPB (premix in dextrose) 900 mg 50 mL, 900 mg, Intravenous, Once, Kellen Egan MD  •  lactated ringers infusion, 20 mL/hr, Intravenous, Continuous, Kellen Egan MD, Last Rate: 20 mL/hr at 07/25/23 0912, 20 mL/hr at 07/25/23 0912  •  lidocaine-epinephrine (XYLOCAINE/EPINEPHRINE) 1 %-1:100,000 20 mL, sodium bicarbonate 2 mEq infiltration, , Infiltration, Once, Kellen Egan MD    Blood Culture:   No results found for: "BLOODCX"    Wound Culture:   No results found for: "WOUNDCULT"    Ins and Outs:  No intake/output data recorded.             Physical Exam  /81   Pulse 96   Temp 98.2 °F (36.8 °C) (Temporal)   Resp 20   Ht 5' 10" (1.778 m)   Wt 94.8 kg (209 lb)   SpO2 95%   BMI 29.99 kg/m²   /81   Pulse 96   Temp 98.2 °F (36.8 °C) (Temporal)   Resp 20   Ht 5' 10" (1.778 m)   Wt 94.8 kg (209 lb)   SpO2 95%   BMI 29.99 kg/m²   Gen: No acute distress, resting comfortably in bed  HEENT: Eyes clear, moist mucus membranes, hearing intact  Respiratory: No audible wheezing or stridor  Cardiovascular: Well Perfused peripherally, 2+ distal pulse  Abdomen: nondistended, no peritoneal signs  Ortho Exam: positive tinels carpal tunnel  Neuro Exam: weakness APB    Lab Results: Reviewed  Imaging: Reviewed

## 2023-07-25 NOTE — OP NOTE
OPERATIVE REPORT  PATIENT NAME: Zoe Aguirre  :  1965  MRN: [id number]  Pt Location: [facility]    SURGERY DATE: 23    Surgeon(s) and Role:     * Haley Rosen MD - Primary     * Salinas Hazel MD - Assisting    Pre-Op Diagnosis:  Right carpal tunnel syndrome [G56.01]    Post-Op Diagnosis:  .Right carpal tunnel syndrome [G56.01]    Procedure(s) (LRB):  Right endoscopic carpal tunnel release (Right)    Specimen(s):  No specimens collected during this procedure. Estimated Blood Loss:   Minimal      Anesthesia Type:   IV Sedation with Anesthesia    Operative Indications: The patient has a history of Carpal Tunnel Syndrome  right that was recalcitrant to conservative management. The decision was made to bring the patient to the operating room for Endoscopic Carpal Tunnel Release  right. Risks of the procedure were explained which include, but are not limited to bleeding; infection; damage to nerves, arteries,veins, tendons; scar; pain; need for reoperation; failure to give desired result; and risks of anaesthesia. All questions were answered to satisfaction and they were willing to proceed. Operative Findings:  Right carpal tunnel    Complications:   None    Procedure and Technique:  After the patient, site, and procedure were identified, the patient was brought into the operating room in a supine position. Local anaesthesia and sedation were provided. A tourniquet was not used. The  right upper extremity was then prepped and drapped in a normal, sterile, orthopedic fashion. After reconfirmation of the patient, site, and surgical procedure, which was agreed upon by the entire surgical team, attention was turned to the right wrist.  The sites of the proximal and distal incisions were marked.   The kendy of the proximal incision was placed horizontally at the midline of the wrist.  The distal incision kendy was longitudinal extending distally from the point of intersection of the line between the long finger and ring finger and the line along the distal border of the fully abducted thumb. The proximal incision was performed. Subcutaneous tissues were dissected. Then the transverse volar antebrachial fascia was perforated with a scalpel. The edges of the skin incision where retracted and the forearm fascia was incised for approximately 1.5 cm proximally with care taken to identify and protect the median nerve. Retractors were used to inspect the transverse carpal ligament distally. A curved Keys dissector was used to glide under the transverse carpal ligament and superficial to the median nerve with confirmation via the washboard feeling. Then the curved Keys was pushed into the palm toward the distal incision site. When the location of the distal skin kendy was adequate, the distal incision was made. Then with retraction of the skin, further dissection and perforation of the palmar fascia was performed with the use of tenotomy scissors. The curved Keys was guided from proximal to distal out the distal incisions without any twisting to allow for dilation of the tract. The curved Keys was removed, and the cannula for the camera was inserted along the same tract, making sure to keep the alignment post on the cannula perpendicular to the plane of the hand without twisting. Then while keeping the wrist in extension, and holding the cannula of the camera in place, the wrist was placed on the hyperextension board. The scope was inserted distally, and a cotton-tip applicator was used proximally to clean the tract as well as the scope. A curved cutting knife was introduced from proximal to distal while keeping visualization with the use of the camera. Without twisting of the canula, the knife was used to cut the transverse carpal ligament completely, making sure there were no remnant fibers. Then after this was accomplished, the hand was removed from the extension block.   Three maneuvers were used to confirm the full release of the transverse carpal ligament. First, the ease of twisting the trocar of the camera confirmed the release of the ligament. Second, the curved Keys was introduced to make sure there were no remnant fibers that could be felt palmarly. Third, the scope was introduced again to visualize that the whole ligament was released proximally to distally. Additional confirmation of full release included retraction and inspection in the distal and proximal incisions to make sure there were no remnant fibers distally or proximally respectively. At the completion of the procedure, hemostasis was obtained with cautery and direct pressure. The wounds were copiously irrigated with sterile solution. The wounds were closed with Prolene. Sterile dressings were applied, including Xeroform, gauze, tweeners, webril, ACE. Please note, all sponge, needle, and instrument counts were correct prior to closure. Loupe magnification was utilized. The patient tolerated the procedure well. I was present for all critical portions of the procedure.     Patient Disposition:  PACU  and hemodynamically stable    SIGNATURE: Whit Smalls MD  DATE: 07/25/23  TIME: 11:26 AM

## 2023-07-25 NOTE — ANESTHESIA POSTPROCEDURE EVALUATION
Post-Op Assessment Note    CV Status:  Stable  Pain Score: 0    Pain management: adequate     Mental Status:  Alert and awake   Hydration Status:  Euvolemic   PONV Controlled:  Controlled   Airway Patency:  Patent      Post Op Vitals Reviewed: Yes      Staff: CRNA   Comments: Pt awake, alert, able to maintain own airway, VSS, report to recovery RN        No notable events documented.     /64 (07/25/23 1135)    Temp 98.9 °F (37.2 °C) (07/25/23 1135)    Pulse 95 (07/25/23 1135)   Resp 16 (07/25/23 1135)    SpO2 99 % (07/25/23 1135)

## 2023-07-27 ENCOUNTER — OFFICE VISIT (OUTPATIENT)
Dept: INTERNAL MEDICINE CLINIC | Age: 58
End: 2023-07-27
Payer: COMMERCIAL

## 2023-07-27 VITALS
HEIGHT: 70 IN | WEIGHT: 188 LBS | BODY MASS INDEX: 26.92 KG/M2 | TEMPERATURE: 97.1 F | DIASTOLIC BLOOD PRESSURE: 82 MMHG | OXYGEN SATURATION: 96 % | SYSTOLIC BLOOD PRESSURE: 140 MMHG | HEART RATE: 102 BPM

## 2023-07-27 DIAGNOSIS — R25.2 MUSCLE CRAMPING: ICD-10-CM

## 2023-07-27 DIAGNOSIS — R73.03 PREDIABETES: Primary | ICD-10-CM

## 2023-07-27 DIAGNOSIS — M25.511 PAIN, JOINT, SHOULDER REGION, RIGHT: ICD-10-CM

## 2023-07-27 DIAGNOSIS — M62.838 MUSCLE SPASMS OF BOTH LOWER EXTREMITIES: ICD-10-CM

## 2023-07-27 DIAGNOSIS — M75.21 TENDONITIS OF LONG HEAD OF BICEPS BRACHII OF RIGHT SHOULDER: ICD-10-CM

## 2023-07-27 DIAGNOSIS — M25.511 CHRONIC RIGHT SHOULDER PAIN: ICD-10-CM

## 2023-07-27 DIAGNOSIS — G89.29 CHRONIC RIGHT SHOULDER PAIN: ICD-10-CM

## 2023-07-27 DIAGNOSIS — G56.01 CARPAL TUNNEL SYNDROME OF RIGHT WRIST: ICD-10-CM

## 2023-07-27 DIAGNOSIS — M75.51 SUBDELTOID BURSITIS OF RIGHT SHOULDER JOINT: ICD-10-CM

## 2023-07-27 PROCEDURE — 99214 OFFICE O/P EST MOD 30 MIN: CPT | Performed by: INTERNAL MEDICINE

## 2023-07-27 RX ORDER — NAPROXEN SODIUM 220 MG
220 TABLET ORAL 2 TIMES DAILY WITH MEALS
Qty: 20 TABLET | Refills: 0 | Status: CANCELLED | OUTPATIENT
Start: 2023-07-27

## 2023-07-27 RX ORDER — CYCLOBENZAPRINE HCL 10 MG
10 TABLET ORAL
Qty: 30 TABLET | Refills: 1 | Status: SHIPPED | OUTPATIENT
Start: 2023-07-27

## 2023-07-27 RX ORDER — HYDROCODONE BITARTRATE AND ACETAMINOPHEN 5; 325 MG/1; MG/1
1 TABLET ORAL EVERY 6 HOURS PRN
Qty: 60 TABLET | Refills: 0 | Status: SHIPPED | OUTPATIENT
Start: 2023-07-27

## 2023-07-27 RX ORDER — DULOXETIN HYDROCHLORIDE 60 MG/1
60 CAPSULE, DELAYED RELEASE ORAL DAILY
COMMUNITY
Start: 2023-07-03

## 2023-07-27 RX ORDER — TRAMADOL HYDROCHLORIDE 50 MG/1
50 TABLET ORAL EVERY 6 HOURS PRN
Qty: 5 TABLET | Refills: 0 | Status: SHIPPED | OUTPATIENT
Start: 2023-07-27 | End: 2023-08-06

## 2023-07-27 NOTE — PROGRESS NOTES
Name: Joe Teixeira      : 1965      MRN: [id number]  Encounter Provider: Shanta Flor MD  Encounter Date: 2023   Encounter department: [address] [place] [address] [facility]    Assessment & Plan     1. Prediabetes  Assessment & Plan:  Awaiting follow-up lab studies which have not been done as of yet    Orders:  -     Lipid panel; Future; Expected date: 2024  -     HEMOGLOBIN A1C W/ EAG ESTIMATION; Future    2. Muscle spasms of both lower extremities  Assessment & Plan:  Continue muscle relaxants as needed. Orders:  -     cyclobenzaprine (FLEXERIL) 10 mg tablet; Take 1 tablet (10 mg total) by mouth daily at bedtime  -     CBC and differential; Future; Expected date: 2024  -     Comprehensive metabolic panel; Future; Expected date: 2024  -     Lipid panel; Future; Expected date: 2024    3. Chronic right shoulder pain  Assessment & Plan:  Biceps tenosynovitis involving the long head of the biceps tendon without tear. Also noted on MRI imaging was mild subacromial/subdeltoid bursitis    Orders:  -     cyclobenzaprine (FLEXERIL) 10 mg tablet; Take 1 tablet (10 mg total) by mouth daily at bedtime  -     CBC and differential; Future; Expected date: 2024  -     Comprehensive metabolic panel; Future; Expected date: 2024  -     Lipid panel; Future; Expected date: 2024    4. Muscle cramping  Assessment & Plan:  Maintain adequate hydration continue muscle relaxants as needed    Orders:  -     cyclobenzaprine (FLEXERIL) 10 mg tablet; Take 1 tablet (10 mg total) by mouth daily at bedtime  -     CBC and differential; Future; Expected date: 2024  -     Comprehensive metabolic panel; Future; Expected date: 2024  -     Lipid panel; Future; Expected date: 2024    5. Tendonitis of long head of biceps brachii of right shoulder  Assessment & Plan:  As previously noted.   Course of physical therapy would likely be beneficial.  Will defer continued anti-inflammatory medications to recent findings on EGD and some mild irritation. Consequent to recent EGD findings. Orders:  -     CBC and differential; Future; Expected date: 01/02/2024  -     Comprehensive metabolic panel; Future; Expected date: 01/02/2024  -     Lipid panel; Future; Expected date: 01/02/2024    6. Subdeltoid bursitis of right shoulder joint  Assessment & Plan:  Course of physical therapy would likely be beneficial once he heals up from his carpal tunnel surgery    Orders:  -     CBC and differential; Future; Expected date: 01/02/2024  -     Comprehensive metabolic panel; Future; Expected date: 01/02/2024  -     Lipid panel; Future; Expected date: 01/02/2024    7. Carpal tunnel syndrome of right wrist  Assessment & Plan:  Status post operative repair. Orders:  -     traMADol (ULTRAM) 50 mg tablet; Take 1 tablet (50 mg total) by mouth every 6 (six) hours as needed (pain) for up to 10 days  -     CBC and differential; Future; Expected date: 01/02/2024  -     Comprehensive metabolic panel; Future; Expected date: 01/02/2024  -     Lipid panel; Future; Expected date: 01/02/2024    8. Pain, joint, shoulder region, right  -     HYDROcodone-acetaminophen (Norco) 5-325 mg per tablet; Take 1 tablet by mouth every 6 (six) hours as needed for pain Max Daily Amount: 4 tablets  -     CBC and differential; Future; Expected date: 01/02/2024  -     Comprehensive metabolic panel; Future; Expected date: 01/02/2024  -     Lipid panel; Future; Expected date: 01/02/2024           Subjective     Patient presents for a follow-up visit. Recently underwent carpal tunnel repair of the right wrist 2 days ago. He has some mild discomfort but no major pain. He has not had any additional lab studies performed since his office visit in May. He had multiple blood tests ordered which were never done. Review of Systems   Constitutional: Negative. HENT: Negative. Eyes: Negative. Respiratory: Negative. Cardiovascular: Negative. Gastrointestinal: Negative for abdominal distention, abdominal pain and blood in stool. Intermittent dyspepsia   Endocrine: Negative. Genitourinary: Negative. Musculoskeletal: Positive for arthralgias (Right shoulder bursitis/tendinitis) and myalgias (Lower extremity muscle spasms). Skin: Negative. Allergic/Immunologic: Negative. Neurological: Negative. Hematological: Negative. Psychiatric/Behavioral: Negative. Past Medical History:   Diagnosis Date   • Anxiety    • Depression    • GERD (gastroesophageal reflux disease)    • Mood swings    • Rheumatic fever     age 7     Past Surgical History:   Procedure Laterality Date   • CARPAL TUNNEL RELEASE  07/25/2023   • COLONOSCOPY     • EGD       Family History   Problem Relation Age of Onset   • Diabetes Mother    • Kidney disease Mother    • Heart disease Father    • Leukemia Father 68   • Colon cancer Father      Social History     Socioeconomic History   • Marital status: Single     Spouse name: None   • Number of children: None   • Years of education: None   • Highest education level: None   Occupational History   • None   Tobacco Use   • Smoking status: Every Day     Packs/day: 0.03     Years: 34.00     Total pack years: 1.02     Types: Cigarettes     Start date: 1989   • Smokeless tobacco: Never   • Tobacco comments:     3-4 cigarettes day   Vaping Use   • Vaping Use: Never used   Substance and Sexual Activity   • Alcohol use:  Yes     Alcohol/week: 6.0 standard drinks of alcohol     Types: 6 Cans of beer per week     Comment: weekends   • Drug use: Never   • Sexual activity: None   Other Topics Concern   • None   Social History Narrative   • None     Social Determinants of Health     Financial Resource Strain: Not on file   Food Insecurity: Not on file   Transportation Needs: Not on file   Physical Activity: Not on file   Stress: Not on file   Social Connections: Not on file   Intimate Partner Violence: Not on file   Housing Stability: Not on file     Current Outpatient Medications on File Prior to Visit   Medication Sig   • acetaminophen (TYLENOL) 650 mg CR tablet Take 1 tablet (650 mg total) by mouth every 8 (eight) hours as needed for mild pain   • Diclofenac Sodium (VOLTAREN) 1 % Apply 2 g topically 2 (two) times a day   • doxepin (SINEquan) 25 mg capsule Take 25 mg by mouth daily at bedtime   • DULoxetine (CYMBALTA) 60 mg delayed release capsule Take 60 mg by mouth daily   • FLUoxetine (PROzac) 20 mg capsule Take 20 mg by mouth daily   • lurasidone (LATUDA) 40 mg tablet Take 40 mg by mouth daily with breakfast   • pantoprazole (PROTONIX) 40 mg tablet Take 1 tablet (40 mg total) by mouth daily   • pregabalin (Lyrica) 100 mg capsule Take 1 capsule (100 mg total) by mouth 3 (three) times a day   • [DISCONTINUED] cyclobenzaprine (FLEXERIL) 10 mg tablet Take 1 tablet (10 mg total) by mouth daily at bedtime   • [DISCONTINUED] diclofenac sodium (VOLTAREN) 50 mg EC tablet Take 1 tablet (50 mg total) by mouth 2 (two) times a day   • [DISCONTINUED] HYDROcodone-acetaminophen (Norco) 5-325 mg per tablet Take 1 tablet by mouth every 6 (six) hours as needed for pain Max Daily Amount: 4 tablets   • [DISCONTINUED] naproxen sodium (ALEVE) 220 MG tablet Take 1 tablet (220 mg total) by mouth 2 (two) times a day with meals   • [DISCONTINUED] traMADol (ULTRAM) 50 mg tablet Take 1 tablet (50 mg total) by mouth every 6 (six) hours as needed (pain) for up to 10 days   • [DISCONTINUED] DULoxetine (CYMBALTA) 30 mg delayed release capsule Take 30 mg by mouth daily     Allergies   Allergen Reactions   • Penicillins Anaphylaxis     Immunization History   Administered Date(s) Administered   • COVID-19 MODERNA VACC 0.5 ML IM 04/09/2021, 05/07/2021, 12/21/2021       Objective     /82 (BP Location: Left arm, Patient Position: Sitting, Cuff Size: Large)   Pulse 102   Temp (!) 97.1 °F (36.2 °C) (Temporal)   Ht 5' 10" (1.778 m)   Wt 85.3 kg (188 lb)   SpO2 96% Comment: room air  BMI 26.98 kg/m²     Physical Exam  Vitals reviewed. Constitutional:       General: He is not in acute distress. Appearance: Normal appearance. He is normal weight. He is not ill-appearing, toxic-appearing or diaphoretic. HENT:      Head: Normocephalic and atraumatic. Right Ear: External ear normal.      Left Ear: External ear normal.      Nose: Nose normal.   Eyes:      General: No scleral icterus. Conjunctiva/sclera: Conjunctivae normal.      Pupils: Pupils are equal, round, and reactive to light. Neck:      Vascular: No carotid bruit or JVD. Trachea: No tracheal deviation. Cardiovascular:      Rate and Rhythm: Normal rate and regular rhythm. Heart sounds: Normal heart sounds. No murmur heard. Pulmonary:      Effort: Pulmonary effort is normal. No respiratory distress. Breath sounds: Normal breath sounds. No rales. Abdominal:      General: Abdomen is flat. There is no distension. Musculoskeletal:         General: No swelling. Cervical back: Neck supple. Right lower leg: No edema. Left lower leg: No edema. Comments: Dressing applied to the right wrist consequent to his recent carpal tunnel release surgery   Skin:     General: Skin is warm. Coloration: Skin is not jaundiced. Findings: No bruising, erythema or rash. Neurological:      General: No focal deficit present. Mental Status: He is alert and oriented to person, place, and time. Mental status is at baseline.    Psychiatric:         Mood and Affect: Mood normal.         Behavior: Behavior normal.       Shanta Flor MD

## 2023-07-27 NOTE — ASSESSMENT & PLAN NOTE
Biceps tenosynovitis involving the long head of the biceps tendon without tear.   Also noted on MRI imaging was mild subacromial/subdeltoid bursitis

## 2023-07-27 NOTE — ASSESSMENT & PLAN NOTE
As previously noted. Course of physical therapy would likely be beneficial.  Will defer continued anti-inflammatory medications to recent findings on EGD and some mild irritation. Consequent to recent EGD findings.

## 2023-07-27 NOTE — ASSESSMENT & PLAN NOTE
Course of physical therapy would likely be beneficial once he heals up from his carpal tunnel surgery

## 2023-07-31 ENCOUNTER — OFFICE VISIT (OUTPATIENT)
Dept: RHEUMATOLOGY | Facility: CLINIC | Age: 58
End: 2023-07-31
Payer: COMMERCIAL

## 2023-07-31 VITALS — BODY MASS INDEX: 29.49 KG/M2 | HEIGHT: 70 IN | WEIGHT: 206 LBS

## 2023-07-31 DIAGNOSIS — R25.2 MUSCLE CRAMPING: ICD-10-CM

## 2023-07-31 DIAGNOSIS — M62.838 MUSCLE SPASMS OF BOTH LOWER EXTREMITIES: ICD-10-CM

## 2023-07-31 DIAGNOSIS — M79.10 MYALGIA: ICD-10-CM

## 2023-07-31 DIAGNOSIS — Z86.79 HX OF RHEUMATIC FEVER: ICD-10-CM

## 2023-07-31 DIAGNOSIS — M54.2 NECK PAIN: Primary | ICD-10-CM

## 2023-07-31 PROCEDURE — 99244 OFF/OP CNSLTJ NEW/EST MOD 40: CPT | Performed by: INTERNAL MEDICINE

## 2023-07-31 NOTE — LETTER
August 1, 2023     Lisa Eddy, [address] [facility] [address]    Patient: Archana Mckeon   YOB: 1965   Date of Visit: 7/31/2023       Dear Dr. Miller Camera: Thank you for referring Addis Aguirre to me for evaluation. Below are my notes for this consultation. If you have questions, please do not hesitate to call me. I look forward to following your patient along with you. Sincerely,        Stanley Arambula MD        CC: No Recipients    Stanley Arambula MD  7/31/2023 12:34 PM  Signed  Rheumatology Consult   Addis Aguirre 65 y.o. male 1965    DATE: 7/31/2023    Reason for Consult: myalgias/craming    Assessment and Plan:  Myalgias/spasms--+RF  Check myomarker III plus profile  +RF--check Hep. C  Cervical radiculitis, cervicalgia--xray C-spine  Referral to comprehensive spine  Right shoulder tendinitis/bursitis  Celebrex 200mg daily x 2 weeks with PPI for gastroprotection as patient with PUD  Continue to f/u with ortho. for CTS release  Anti-inflammatory diet/exercise/weight control  Increase CV fitness/aerobic activity  Patient to continue to monitor symptoms  Topical NSAIDs/analgesics PRN joint pain  Tonic water and Mg supplementation for muscle cramps. F/u in 6 mos. or sooner if necessary    History of Present Illness:  Archana Mckeon is a 65 y.o. male Here for initial eval.of muscle cramping, neck pain, low back pain s/p right carpal tunnel release last week . No constitutional symptoms. Xray shoulders reviewed--no DJD  MRI right shoulder with biceps tendinitis and subacrobial bursitis, mild DJD AC joint. All previous labs reviewed, +RF       Review of Systems  Review of Systems   Musculoskeletal: Positive for arthralgias, myalgias, neck pain and neck stiffness. Negative for joint swelling. Skin: Negative for rash.        Allergies  Allergies   Allergen Reactions   • Penicillins Anaphylaxis       Current Medications      Past Medical History  Past Medical History:   Diagnosis Date   • Anxiety    • Depression    • GERD (gastroesophageal reflux disease)    • Mood swings    • Rheumatic fever     age 7       Past Surgical History  Past Surgical History:   Procedure Laterality Date   • CARPAL TUNNEL RELEASE  07/25/2023   • COLONOSCOPY     • EGD     • [place] NDSC WRST SURG W/RLS TRANSVRS CARPL LIGM Right 7/25/2023    Procedure: Right endoscopic carpal tunnel release;  Surgeon: Gary Viveros MD;  Location: [facility];  Service: Orthopedics       Family History  No known autoimmune or inflammatory diseases in the family. Family History   Problem Relation Age of Onset   • Diabetes Mother    • Kidney disease Mother    • Heart disease Father    • Leukemia Father 68   • Colon cancer Father        Social History  Occupation: [employer]  Social History     Substance and Sexual Activity   Alcohol Use Yes   • Alcohol/week: 6.0 standard drinks of alcohol   • Types: 6 Cans of beer per week    Comment: weekends     Social History     Substance and Sexual Activity   Drug Use Never     Social History     Tobacco Use   Smoking Status Every Day   • Packs/day: 0.03   • Years: 34.00   • Total pack years: 1.02   • Types: Cigarettes   • Start date: 1989   Smokeless Tobacco Never   Tobacco Comments    3-4 cigarettes day        Objective: There were no vitals taken for this visit. Physical Exam  Constitutional:       General: He is not in acute distress. HENT:      Head: Normocephalic and atraumatic. Eyes:      Conjunctiva/sclera: Conjunctivae normal.   Cardiovascular:      Rate and Rhythm: Normal rate and regular rhythm. Heart sounds: S1 normal and S2 normal.      No friction rub. Pulmonary:      Effort: Pulmonary effort is normal. No respiratory distress. Breath sounds: Normal breath sounds. No wheezing, rhonchi or rales. Musculoskeletal:      Cervical back: Neck supple. Pain with movement present. Decreased range of motion.    Skin: General: Skin is warm and dry. Coloration: Skin is not pale. Findings: No rash. Neurological:      Mental Status: He is alert. Mental status is at baseline. Psychiatric:         Mood and Affect: Mood normal.         Behavior: Behavior normal.            Lab Results: I have personally reviewed pertinent reports.       CBC:   , Chemistry Profile:       Invalid input(s): "ALBUMIN", Coagulation Studies:   , Cardiac Studies:   , Additional Labs:   , iSTAT CHEM 8:       Invalid input(s): "POTASSIUMIS", ABG:   , Toxicology:   , Last A1C/Lipid Panel/Thyroid Panel:   Lab Results   Component Value Date    HGBA1C 5.9 (H) 10/27/2022    HGBA1C 5.9 10/27/2022    YTI1HLCTLBHS 2.670 02/07/2023    M7FBHOV 1.50 02/07/2023    FREET4 0.80 02/07/2023     No results found for: "CRP", "SEDRATE", "ОЛЕГ", "RF", "HAV", "HEPAIGM", "HEPBIGM", "HEPBCAB", "HEPCAB", "HBEAG"        Invalid input(s): "Epifanio Cervantes"         Imaging: I have personally reviewed pertinent films in PACS

## 2023-07-31 NOTE — PATIENT INSTRUCTIONS
I ordered an xray of your neck. Try over the counter Salonpas lidocaine patches and put on your neck and leave on for 12 hours. I placed an order for our comprehensive spine program.  Try to do some exercises for your shoulder to strengthen the rotator cuff muscles. Drink one glass of tonic water every day and take Magnesium 400mg every day to help with the muscle cramping.

## 2023-07-31 NOTE — PROGRESS NOTES
Rheumatology Consult   Megan Rodriguez Colon 65 y.o. male 1965    DATE: 7/31/2023    Reason for Consult: myalgias/craming    Assessment and Plan:  Myalgias/spasms--+RF  Check myomarker III plus profile  +RF--check Hep. C  Cervical radiculitis, cervicalgia--xray C-spine  Referral to comprehensive spine  Right shoulder tendinitis/bursitis  Celebrex 200mg daily x 2 weeks with PPI for gastroprotection as patient with PUD  Continue to f/u with ortho. for CTS release  Anti-inflammatory diet/exercise/weight control  Increase CV fitness/aerobic activity  Patient to continue to monitor symptoms  Topical NSAIDs/analgesics PRN joint pain  Tonic water and Mg supplementation for muscle cramps. F/u in 6 mos. or sooner if necessary    History of Present Illness:  Jose Guerrero is a 65 y.o. male Here for initial eval.of muscle cramping, neck pain, low back pain s/p right carpal tunnel release last week . No constitutional symptoms. Xray shoulders reviewed--no DJD  MRI right shoulder with biceps tendinitis and subacrobial bursitis, mild DJD AC joint. All previous labs reviewed, +RF       Review of Systems  Review of Systems   Musculoskeletal: Positive for arthralgias, myalgias, neck pain and neck stiffness. Negative for joint swelling. Skin: Negative for rash.        Allergies  Allergies   Allergen Reactions   • Penicillins Anaphylaxis       Current Medications      Past Medical History  Past Medical History:   Diagnosis Date   • Anxiety    • Depression    • GERD (gastroesophageal reflux disease)    • Mood swings    • Rheumatic fever     age 7       Past Surgical History  Past Surgical History:   Procedure Laterality Date   • CARPAL TUNNEL RELEASE  07/25/2023   • COLONOSCOPY     • EGD     • CT NDSC WRST SURG W/RLS TRANSVRS CARPL LIGM Right 7/25/2023    Procedure: Right endoscopic carpal tunnel release;  Surgeon: Rey Wilkes MD;  Location: [facility];  Service: Orthopedics       Family History  No known autoimmune or inflammatory diseases in the family. Family History   Problem Relation Age of Onset   • Diabetes Mother    • Kidney disease Mother    • Heart disease Father    • Leukemia Father 68   • Colon cancer Father        Social History  Occupation: , [employer]  Social History     Substance and Sexual Activity   Alcohol Use Yes   • Alcohol/week: 6.0 standard drinks of alcohol   • Types: 6 Cans of beer per week    Comment: weekends     Social History     Substance and Sexual Activity   Drug Use Never     Social History     Tobacco Use   Smoking Status Every Day   • Packs/day: 0.03   • Years: 34.00   • Total pack years: 1.02   • Types: Cigarettes   • Start date: 1989   Smokeless Tobacco Never   Tobacco Comments    3-4 cigarettes day        Objective: There were no vitals taken for this visit. Physical Exam  Constitutional:       General: He is not in acute distress. HENT:      Head: Normocephalic and atraumatic. Eyes:      Conjunctiva/sclera: Conjunctivae normal.   Cardiovascular:      Rate and Rhythm: Normal rate and regular rhythm. Heart sounds: S1 normal and S2 normal.      No friction rub. Pulmonary:      Effort: Pulmonary effort is normal. No respiratory distress. Breath sounds: Normal breath sounds. No wheezing, rhonchi or rales. Musculoskeletal:      Cervical back: Neck supple. Pain with movement present. Decreased range of motion. Skin:     General: Skin is warm and dry. Coloration: Skin is not pale. Findings: No rash. Neurological:      Mental Status: He is alert. Mental status is at baseline. Psychiatric:         Mood and Affect: Mood normal.         Behavior: Behavior normal.            Lab Results: I have personally reviewed pertinent reports.       CBC:   , Chemistry Profile:       Invalid input(s): "ALBUMIN", Coagulation Studies:   , Cardiac Studies:   , Additional Labs:   , iSTAT CHEM 8:       Invalid input(s): "POTASSIUMIS", ABG:   , Toxicology:   , Last A1C/Lipid Panel/Thyroid Panel:   Lab Results   Component Value Date    HGBA1C 5.9 (H) 10/27/2022    HGBA1C 5.9 10/27/2022    [id number] 2.670 02/07/2023    M3DLZRN 1.50 02/07/2023    FREET4 0.80 02/07/2023     No results found for: "CRP", "SEDRATE", "ОЛЕГ", "RF", "HAV", "HEPAIGM", "HEPBIGM", "HEPBCAB", "HEPCAB", "HBEAG"        Invalid input(s): "Amanda Medrano"         Imaging: I have personally reviewed pertinent films in PACS

## 2023-08-01 ENCOUNTER — NURSE TRIAGE (OUTPATIENT)
Dept: PHYSICAL THERAPY | Facility: OTHER | Age: 58
End: 2023-08-01

## 2023-08-01 DIAGNOSIS — G89.29 CHRONIC NECK PAIN: Primary | ICD-10-CM

## 2023-08-01 DIAGNOSIS — M54.2 CHRONIC NECK PAIN: Primary | ICD-10-CM

## 2023-08-01 NOTE — TELEPHONE ENCOUNTER
Additional Information  • Negative: Is this related to a work injury? • Negative: Is this related to an MVA? • Negative: Are you currently recieving homecare services? Background - Initial Assessment  Clinical complaint: Right Sided Neck Pain. Hx of neck pain for a year. Patient states pain radiates to head and shoulders. No numbness or tingling. NKI. Pain is intermittent but persistent, worse in the morning especially with movement. Seen by Rheumatology yesterday 07/31 and FM 07/27. Patient described pain as sharp. He takes "pain killers everyday". Patient has had PT in the past with no relief. Date of onset: a year ago. Frequency of pain: intermittent, persistent.   Quality of pain: sharp    Protocols used: SL AMB COMPREHENSIVE SPINE PROGRAM PROTOCOL

## 2023-08-01 NOTE — TELEPHONE ENCOUNTER
Additional Information  • Negative: Has the patient had unexplained weight loss? • Negative: Does the patient have a fever? • Negative: Is the patient experiencing acute drop foot or paralysis? • Negative: Is the patient experiencing urine retention? • Negative: Has the patient experienced major trauma? (fall from height, high speed collision, direct blow to spine) and is also experiencing nausea, light-headedness, or loss of consciousness? • Negative: Is the patient experiencing blood in sputum? • Affirmative: Is this a chronic condition? Protocols used: SL AMB COMPREHENSIVE SPINE PROGRAM PROTOCOL    This RN did review in detail the Comprehensive Spine Program and what we can provide for their neck pain. Patient is agreeable to being triaged by this RN and would like to proceed with an appointment with [facility]. Patient informed that a referral would be sent to that office and they would be contacting him to discuss an appointment. No further questions and/or concerns were voiced by the patient at this time. Patient states understanding of the referral that was placed. Norm El

## 2023-08-04 ENCOUNTER — OFFICE VISIT (OUTPATIENT)
Dept: OBGYN CLINIC | Facility: HOSPITAL | Age: 58
End: 2023-08-04

## 2023-08-04 VITALS
SYSTOLIC BLOOD PRESSURE: 141 MMHG | DIASTOLIC BLOOD PRESSURE: 92 MMHG | HEART RATE: 91 BPM | HEIGHT: 70 IN | BODY MASS INDEX: 29.49 KG/M2 | WEIGHT: 206 LBS

## 2023-08-04 DIAGNOSIS — G56.01 RIGHT CARPAL TUNNEL SYNDROME: Primary | ICD-10-CM

## 2023-08-04 PROCEDURE — 99024 POSTOP FOLLOW-UP VISIT: CPT | Performed by: PHYSICIAN ASSISTANT

## 2023-08-04 NOTE — PROGRESS NOTES
Assessment:   S/P Right endoscopic carpal tunnel release - Right on 7/25/2023    Plan:   Patient was advised to avoid soaking for 2 to 3 days to allow the incision to finish healing   Patient was advised that they can have palmar pain for 3 to 4 months after surgery which is normal  They was advised to use heat massage as demonstrated in the office  They will follow-up with us as needed    Follow Up:  As needed    To Do Next Visit:  Reevaluation      CHIEF COMPLAINT:  Chief Complaint   Patient presents with   • Right Wrist - Post-op, Suture / Staple Removal     ECTR- 7/25/23         SUBJECTIVE:  Davian Sawyer is a 65 y.o. male who presents for follow up after Right endoscopic carpal tunnel release - Right on 7/25/2023. Today patient has improvement in his numbness and tingling symptoms.        PHYSICAL EXAMINATION:  Vital signs: /92   Pulse 91   Ht 5' 10" (1.778 m)   Wt 93.4 kg (206 lb)   BMI 29.56 kg/m²   General: well developed and well nourished, alert, oriented times 3 and appears comfortable  Psychiatric: Normal    MUSCULOSKELETAL EXAMINATION:  Incision: Clean, dry, intact  Range of Motion: As expected, opposition intact and full composite fist possible  Neurovascular status: Neuro intact, good cap refill  Activity Restrictions: No restrictions  Done today: Sutures out      STUDIES REVIEWED:  No Studies to review      PROCEDURES PERFORMED:  Procedures  No Procedures performed today

## 2023-10-24 ENCOUNTER — TELEPHONE (OUTPATIENT)
Age: 58
End: 2023-10-24

## 2023-10-24 DIAGNOSIS — R25.2 MUSCLE CRAMPING: ICD-10-CM

## 2023-10-24 DIAGNOSIS — M62.838 MUSCLE SPASMS OF BOTH LOWER EXTREMITIES: ICD-10-CM

## 2023-10-24 DIAGNOSIS — G89.29 CHRONIC RIGHT SHOULDER PAIN: ICD-10-CM

## 2023-10-24 DIAGNOSIS — M25.511 CHRONIC RIGHT SHOULDER PAIN: ICD-10-CM

## 2023-10-24 RX ORDER — CYCLOBENZAPRINE HCL 10 MG
10 TABLET ORAL
Qty: 30 TABLET | Refills: 1 | Status: SHIPPED | OUTPATIENT
Start: 2023-10-24

## 2023-10-24 NOTE — TELEPHONE ENCOUNTER
Rx refill for cyclobenzaprine (FLEXERIL) 10 mg tablet.     Send to [address] [facility], [address] [facility] [address] [facility]     Nov- 1/12/24

## 2023-12-07 ENCOUNTER — APPOINTMENT (OUTPATIENT)
Dept: LAB | Facility: CLINIC | Age: 58
End: 2023-12-07
Payer: COMMERCIAL

## 2023-12-07 DIAGNOSIS — M79.10 MYALGIA: ICD-10-CM

## 2023-12-07 DIAGNOSIS — G56.01 CARPAL TUNNEL SYNDROME OF RIGHT WRIST: ICD-10-CM

## 2023-12-07 DIAGNOSIS — M75.51 SUBDELTOID BURSITIS OF RIGHT SHOULDER JOINT: ICD-10-CM

## 2023-12-07 DIAGNOSIS — M25.511 PAIN, JOINT, SHOULDER REGION, RIGHT: ICD-10-CM

## 2023-12-07 DIAGNOSIS — M25.511 CHRONIC RIGHT SHOULDER PAIN: ICD-10-CM

## 2023-12-07 DIAGNOSIS — G89.29 CHRONIC RIGHT SHOULDER PAIN: ICD-10-CM

## 2023-12-07 DIAGNOSIS — R73.03 PREDIABETES: ICD-10-CM

## 2023-12-07 DIAGNOSIS — M75.21 TENDONITIS OF LONG HEAD OF BICEPS BRACHII OF RIGHT SHOULDER: ICD-10-CM

## 2023-12-07 DIAGNOSIS — M62.838 MUSCLE SPASMS OF BOTH LOWER EXTREMITIES: ICD-10-CM

## 2023-12-07 DIAGNOSIS — R25.2 MUSCLE CRAMPING: ICD-10-CM

## 2023-12-07 LAB
ALBUMIN SERPL BCP-MCNC: 4.3 G/DL (ref 3.5–5)
ALP SERPL-CCNC: 50 U/L (ref 34–104)
ALT SERPL W P-5'-P-CCNC: 28 U/L (ref 7–52)
ANION GAP SERPL CALCULATED.3IONS-SCNC: 11 MMOL/L
AST SERPL W P-5'-P-CCNC: 25 U/L (ref 13–39)
BASOPHILS # BLD AUTO: 0.08 THOUSANDS/ÂΜL (ref 0–0.1)
BASOPHILS NFR BLD AUTO: 1 % (ref 0–1)
BILIRUB SERPL-MCNC: 0.57 MG/DL (ref 0.2–1)
BUN SERPL-MCNC: 18 MG/DL (ref 5–25)
CALCIUM SERPL-MCNC: 9.8 MG/DL (ref 8.4–10.2)
CHLORIDE SERPL-SCNC: 103 MMOL/L (ref 96–108)
CHOLEST SERPL-MCNC: 228 MG/DL
CK SERPL-CCNC: 87 U/L (ref 39–308)
CO2 SERPL-SCNC: 25 MMOL/L (ref 21–32)
CREAT SERPL-MCNC: 1.45 MG/DL (ref 0.6–1.3)
CRP SERPL QL: 5.7 MG/L
EOSINOPHIL # BLD AUTO: 0.29 THOUSAND/ÂΜL (ref 0–0.61)
EOSINOPHIL NFR BLD AUTO: 4 % (ref 0–6)
ERYTHROCYTE [DISTWIDTH] IN BLOOD BY AUTOMATED COUNT: 13.1 % (ref 11.6–15.1)
EST. AVERAGE GLUCOSE BLD GHB EST-MCNC: 128 MG/DL
GFR SERPL CREATININE-BSD FRML MDRD: 53 ML/MIN/1.73SQ M
GLUCOSE SERPL-MCNC: 157 MG/DL (ref 65–140)
HBA1C MFR BLD: 6.1 %
HCT VFR BLD AUTO: 51.3 % (ref 36.5–49.3)
HCV AB SER QL: NORMAL
HDLC SERPL-MCNC: 52 MG/DL
HGB BLD-MCNC: 17.2 G/DL (ref 12–17)
IMM GRANULOCYTES # BLD AUTO: 0.03 THOUSAND/UL (ref 0–0.2)
IMM GRANULOCYTES NFR BLD AUTO: 0 % (ref 0–2)
LDLC SERPL CALC-MCNC: 120 MG/DL (ref 0–100)
LYMPHOCYTES # BLD AUTO: 2.57 THOUSANDS/ÂΜL (ref 0.6–4.47)
LYMPHOCYTES NFR BLD AUTO: 32 % (ref 14–44)
MCH RBC QN AUTO: 31.4 PG (ref 26.8–34.3)
MCHC RBC AUTO-ENTMCNC: 33.5 G/DL (ref 31.4–37.4)
MCV RBC AUTO: 94 FL (ref 82–98)
MONOCYTES # BLD AUTO: 0.58 THOUSAND/ÂΜL (ref 0.17–1.22)
MONOCYTES NFR BLD AUTO: 7 % (ref 4–12)
NEUTROPHILS # BLD AUTO: 4.56 THOUSANDS/ÂΜL (ref 1.85–7.62)
NEUTS SEG NFR BLD AUTO: 56 % (ref 43–75)
NONHDLC SERPL-MCNC: 176 MG/DL
NRBC BLD AUTO-RTO: 0 /100 WBCS
PLATELET # BLD AUTO: 245 THOUSANDS/UL (ref 149–390)
PMV BLD AUTO: 10.7 FL (ref 8.9–12.7)
POTASSIUM SERPL-SCNC: 3.8 MMOL/L (ref 3.5–5.3)
PROT SERPL-MCNC: 8 G/DL (ref 6.4–8.4)
RBC # BLD AUTO: 5.48 MILLION/UL (ref 3.88–5.62)
SODIUM SERPL-SCNC: 139 MMOL/L (ref 135–147)
TRIGL SERPL-MCNC: 278 MG/DL
WBC # BLD AUTO: 8.11 THOUSAND/UL (ref 4.31–10.16)

## 2023-12-07 PROCEDURE — 86200 CCP ANTIBODY: CPT

## 2023-12-07 PROCEDURE — 83516 IMMUNOASSAY NONANTIBODY: CPT

## 2023-12-07 PROCEDURE — 83036 HEMOGLOBIN GLYCOSYLATED A1C: CPT

## 2023-12-07 PROCEDURE — 86235 NUCLEAR ANTIGEN ANTIBODY: CPT

## 2023-12-07 PROCEDURE — 80061 LIPID PANEL: CPT

## 2023-12-07 PROCEDURE — 82085 ASSAY OF ALDOLASE: CPT

## 2023-12-07 PROCEDURE — 86803 HEPATITIS C AB TEST: CPT

## 2023-12-07 PROCEDURE — 82550 ASSAY OF CK (CPK): CPT

## 2023-12-07 PROCEDURE — 86430 RHEUMATOID FACTOR TEST QUAL: CPT

## 2023-12-07 PROCEDURE — 80053 COMPREHEN METABOLIC PANEL: CPT

## 2023-12-07 PROCEDURE — 36415 COLL VENOUS BLD VENIPUNCTURE: CPT

## 2023-12-07 PROCEDURE — 85025 COMPLETE CBC W/AUTO DIFF WBC: CPT

## 2023-12-07 PROCEDURE — 86140 C-REACTIVE PROTEIN: CPT

## 2023-12-07 PROCEDURE — 83520 IMMUNOASSAY QUANT NOS NONAB: CPT

## 2023-12-08 LAB
ALDOLASE SERPL-CCNC: 4.7 U/L (ref 3.3–10.3)
RHEUMATOID FACT SER QL LA: NEGATIVE

## 2023-12-15 LAB — CCP AB SER IA-ACNC: 1.3

## 2023-12-21 LAB
EJ AB SER QL: NEGATIVE
ENA JO1 AB SER IA-ACNC: <20 UNITS
ENA PM/SCL AB SER-ACNC: <20 UNITS
ENA SS-A 52KD IGG SER IA-ACNC: <20 UNITS
FIBRILLARIN AB SER QL: NEGATIVE
KU AB SER QL: NEGATIVE
MDA5 AB SER LINE BLOT-ACNC: <20 UNITS
MI2 AB SER QL: NEGATIVE
MJ AB SER LINE BLOT-ACNC: <20 UNITS
OJ AB SER QL: NEGATIVE
PL12 AB SER QL: NEGATIVE
PL7 AB SER QL: NEGATIVE
SAE1 IGG SER QL LINE BLOT: <20 UNITS
SRP AB SERPL QL: NEGATIVE
TIF1-GAMMA AB SER LINE BLOT-ACNC: <20 UNITS
U1 SNRNP AB SER IA-ACNC: <20 UNITS
U2 SNRNP AB SER QL: NEGATIVE

## 2024-02-12 DIAGNOSIS — R25.2 MUSCLE CRAMPING: ICD-10-CM

## 2024-02-12 DIAGNOSIS — M25.511 CHRONIC RIGHT SHOULDER PAIN: ICD-10-CM

## 2024-02-12 DIAGNOSIS — M25.511 PAIN, JOINT, SHOULDER REGION, RIGHT: ICD-10-CM

## 2024-02-12 DIAGNOSIS — M62.838 MUSCLE SPASMS OF BOTH LOWER EXTREMITIES: ICD-10-CM

## 2024-02-12 DIAGNOSIS — G89.29 CHRONIC RIGHT SHOULDER PAIN: ICD-10-CM

## 2024-02-12 RX ORDER — HYDROCODONE BITARTRATE AND ACETAMINOPHEN 5; 325 MG/1; MG/1
1 TABLET ORAL EVERY 6 HOURS PRN
Qty: 60 TABLET | Refills: 0 | Status: SHIPPED | OUTPATIENT
Start: 2024-02-12

## 2024-02-12 RX ORDER — CYCLOBENZAPRINE HCL 10 MG
10 TABLET ORAL
Qty: 30 TABLET | Refills: 1 | Status: SHIPPED | OUTPATIENT
Start: 2024-02-12 | End: 2024-02-21 | Stop reason: SDUPTHER

## 2024-02-21 ENCOUNTER — OFFICE VISIT (OUTPATIENT)
Age: 59
End: 2024-02-21
Payer: COMMERCIAL

## 2024-02-21 ENCOUNTER — TELEPHONE (OUTPATIENT)
Dept: RHEUMATOLOGY | Facility: CLINIC | Age: 59
End: 2024-02-21

## 2024-02-21 VITALS
SYSTOLIC BLOOD PRESSURE: 120 MMHG | OXYGEN SATURATION: 98 % | WEIGHT: 208.2 LBS | HEIGHT: 70 IN | BODY MASS INDEX: 29.81 KG/M2 | HEART RATE: 93 BPM | DIASTOLIC BLOOD PRESSURE: 86 MMHG | TEMPERATURE: 97.7 F

## 2024-02-21 DIAGNOSIS — G89.29 CHRONIC RIGHT SHOULDER PAIN: ICD-10-CM

## 2024-02-21 DIAGNOSIS — M25.511 CHRONIC RIGHT SHOULDER PAIN: ICD-10-CM

## 2024-02-21 DIAGNOSIS — R25.2 MUSCLE CRAMPING: ICD-10-CM

## 2024-02-21 DIAGNOSIS — R73.03 PREDIABETES: Primary | ICD-10-CM

## 2024-02-21 DIAGNOSIS — M79.10 MYALGIA: ICD-10-CM

## 2024-02-21 PROCEDURE — 99214 OFFICE O/P EST MOD 30 MIN: CPT | Performed by: INTERNAL MEDICINE

## 2024-02-21 RX ORDER — CYCLOBENZAPRINE HCL 10 MG
10 TABLET ORAL
Qty: 30 TABLET | Refills: 1 | Status: SHIPPED | OUTPATIENT
Start: 2024-02-21

## 2024-02-21 RX ORDER — PREGABALIN 100 MG/1
100 CAPSULE ORAL 3 TIMES DAILY
Qty: 90 CAPSULE | Refills: 1 | Status: SHIPPED | OUTPATIENT
Start: 2024-02-21

## 2024-02-21 NOTE — ASSESSMENT & PLAN NOTE
MRI disclosed multiple findings including mild acromioclavicular degenerative arthritis, tenosynovitis of the long head of the biceps tendon without a tear, subacromial and subdeltoid bursitis.  Discussed the role of physical therapy patient does not wish to pursue this at this time.  His pain is improved with current treatments with Lyrica and muscle relaxants.  Recommended continued rest and reevaluate for physical therapy in the future

## 2024-02-21 NOTE — ASSESSMENT & PLAN NOTE
Lab studies all unremarkable other than a mildly elevated CRP of 5.7 which could be consistent with his arthritis bursitis and tenosynovitis

## 2024-02-21 NOTE — ASSESSMENT & PLAN NOTE
Last recent hemoglobin A1c was 6.1%.  Continue dietary modification and avoidance of concentrated sweets and limited carbohydrates

## 2024-02-21 NOTE — PROGRESS NOTES
Name: Avni Aguirre      : 1965      MRN: [id number]  Encounter Provider: Jovany Bailey MD  Encounter Date: 2024   Encounter department: [facility] [place]    Assessment & Plan     1. Prediabetes  Assessment & Plan:  Last recent hemoglobin A1c was 6.1%.  Continue dietary modification and avoidance of concentrated sweets and limited carbohydrates    Orders:  -     Comprehensive metabolic panel; Future; Expected date: 2024  -     CBC; Future; Expected date: 2024  -     Hemoglobin A1C; Future; Expected date: 2024    2. Myalgia  Assessment & Plan:  Lab studies all unremarkable other than a mildly elevated CRP of 5.7 which could be consistent with his arthritis bursitis and tenosynovitis    Orders:  -     pregabalin (Lyrica) 100 mg capsule; Take 1 capsule (100 mg total) by mouth 3 (three) times a day  -     cyclobenzaprine (FLEXERIL) 10 mg tablet; Take 1 tablet (10 mg total) by mouth daily at bedtime    3. Chronic right shoulder pain  Assessment & Plan:  MRI disclosed multiple findings including mild acromioclavicular degenerative arthritis, tenosynovitis of the long head of the biceps tendon without a tear, subacromial and subdeltoid bursitis.  Discussed the role of physical therapy patient does not wish to pursue this at this time.  His pain is improved with current treatments with Lyrica and muscle relaxants.  Recommended continued rest and reevaluate for physical therapy in the future    Orders:  -     pregabalin (Lyrica) 100 mg capsule; Take 1 capsule (100 mg total) by mouth 3 (three) times a day  -     cyclobenzaprine (FLEXERIL) 10 mg tablet; Take 1 tablet (10 mg total) by mouth daily at bedtime    4. Muscle cramping  Assessment & Plan:  Flexeril was renewed.    Orders:  -     cyclobenzaprine (FLEXERIL) 10 mg tablet; Take 1 tablet (10 mg total) by mouth daily at bedtime           Subjective      The patient presents to the office for a  follow-up visit.  He notes some significant improvement in his left shoulder since initiating therapy with Lyrica.  He continues on antidepressant therapy and his mood appears to be quite stable from previous he continues to complain of right shoulder pain.  MRI revealed some mild tenosynovitis of the long head of the biceps tendon without a tear and some arthritic changes of the right acromioclavicular joint.  Lab studies did not disclose any significant inflammatory muscle process.  The only abnormal was a C-reactive protein of 5.7.  All other lab studies were unremarkable.  Hemoglobin A1c was 6.1%      Review of Systems   Constitutional: Negative.    HENT: Negative.     Eyes: Negative.    Respiratory: Negative.     Cardiovascular: Negative.    Gastrointestinal: Negative.    Endocrine: Negative.    Genitourinary: Negative.    Musculoskeletal:  Positive for arthralgias (Right shoulder).   Skin:         Generalized dry and flaky skin   Allergic/Immunologic: Negative.    Neurological: Negative.    Hematological: Negative.    Psychiatric/Behavioral:  Positive for dysphoric mood (Stable on current medications).        Current Outpatient Medications on File Prior to Visit   Medication Sig    acetaminophen (TYLENOL) 650 mg CR tablet Take 1 tablet (650 mg total) by mouth every 8 (eight) hours as needed for mild pain    Diclofenac Sodium (VOLTAREN) 1 % Apply 2 g topically 2 (two) times a day    doxepin (SINEquan) 25 mg capsule Take 25 mg by mouth daily at bedtime    DULoxetine (CYMBALTA) 60 mg delayed release capsule Take 60 mg by mouth daily    FLUoxetine (PROzac) 20 mg capsule Take 20 mg by mouth daily    HYDROcodone-acetaminophen (Norco) 5-325 mg per tablet Take 1 tablet by mouth every 6 (six) hours as needed for pain Max Daily Amount: 4 tablets    lurasidone (LATUDA) 40 mg tablet Take 120 mg by mouth daily with breakfast    pantoprazole (PROTONIX) 40 mg tablet Take 1 tablet (40 mg total) by mouth daily     "[DISCONTINUED] cyclobenzaprine (FLEXERIL) 10 mg tablet Take 1 tablet (10 mg total) by mouth daily at bedtime    [DISCONTINUED] pregabalin (Lyrica) 100 mg capsule Take 1 capsule (100 mg total) by mouth 3 (three) times a day       Objective     /86 (BP Location: Left arm, Patient Position: Sitting, Cuff Size: Adult)   Pulse 93   Temp 97.7 °F (36.5 °C) (Tympanic)   Ht 5' 10\" (1.778 m)   Wt 94.4 kg (208 lb 3.2 oz)   SpO2 98% Comment: ra  BMI 29.87 kg/m²     Physical Exam  Vitals reviewed.   Constitutional:       General: He is not in acute distress.     Appearance: Normal appearance. He is not ill-appearing, toxic-appearing or diaphoretic.   HENT:      Head: Normocephalic and atraumatic.      Right Ear: External ear normal.      Left Ear: External ear normal.      Nose: Nose normal.      Mouth/Throat:      Mouth: Mucous membranes are moist.   Eyes:      General: No scleral icterus.     Conjunctiva/sclera: Conjunctivae normal.      Pupils: Pupils are equal, round, and reactive to light.   Cardiovascular:      Rate and Rhythm: Normal rate and regular rhythm.      Heart sounds: Normal heart sounds. No murmur heard.  Pulmonary:      Effort: Pulmonary effort is normal. No respiratory distress.      Breath sounds: Normal breath sounds.   Abdominal:      General: Abdomen is flat. There is no distension.      Tenderness: There is no abdominal tenderness.   Musculoskeletal:         General: Tenderness present. No swelling, deformity or signs of injury.      Cervical back: Neck supple. No rigidity.      Right lower leg: No edema.      Left lower leg: No edema.   Skin:     General: Skin is warm.      Coloration: Skin is not jaundiced.      Findings: No bruising, erythema or rash.   Neurological:      General: No focal deficit present.      Mental Status: He is alert and oriented to person, place, and time. Mental status is at baseline.   Psychiatric:         Mood and Affect: Mood normal.         Behavior: Behavior " normal.       Jovany Bailey MD

## 2024-04-10 ENCOUNTER — TELEPHONE (OUTPATIENT)
Age: 59
End: 2024-04-10

## 2024-04-10 NOTE — TELEPHONE ENCOUNTER
Caller: Patient     Doctor/Office: Rheumatology     Call regarding :  rs missed appt      Call was transferred to: Rheumatology

## 2024-04-25 ENCOUNTER — OFFICE VISIT (OUTPATIENT)
Age: 59
End: 2024-04-25
Payer: MEDICARE

## 2024-04-25 VITALS
HEIGHT: 69 IN | BODY MASS INDEX: 30.33 KG/M2 | HEART RATE: 86 BPM | SYSTOLIC BLOOD PRESSURE: 136 MMHG | WEIGHT: 204.8 LBS | OXYGEN SATURATION: 94 % | TEMPERATURE: 98.2 F | DIASTOLIC BLOOD PRESSURE: 82 MMHG

## 2024-04-25 DIAGNOSIS — K21.00 GASTROESOPHAGEAL REFLUX DISEASE WITH ESOPHAGITIS WITHOUT HEMORRHAGE: ICD-10-CM

## 2024-04-25 DIAGNOSIS — M47.812 SPONDYLOSIS OF CERVICAL REGION WITHOUT MYELOPATHY OR RADICULOPATHY: ICD-10-CM

## 2024-04-25 DIAGNOSIS — M25.511 CHRONIC RIGHT SHOULDER PAIN: ICD-10-CM

## 2024-04-25 DIAGNOSIS — M79.18 MYOFASCIAL PAIN SYNDROME: Primary | ICD-10-CM

## 2024-04-25 DIAGNOSIS — M75.51 SUBDELTOID BURSITIS OF RIGHT SHOULDER JOINT: ICD-10-CM

## 2024-04-25 DIAGNOSIS — G89.29 CHRONIC RIGHT SHOULDER PAIN: ICD-10-CM

## 2024-04-25 PROCEDURE — 99214 OFFICE O/P EST MOD 30 MIN: CPT | Performed by: PHYSICIAN ASSISTANT

## 2024-04-25 RX ORDER — CYCLOBENZAPRINE HCL 10 MG
10 TABLET ORAL
Qty: 30 TABLET | Refills: 1 | Status: SHIPPED | OUTPATIENT
Start: 2024-04-25

## 2024-04-25 NOTE — ASSESSMENT & PLAN NOTE
Ongoing right shoulder pain.  He does have evidence of biceps tenosynovitis and subacromial/subdeltoid bursitis seen on an MRI from March 2023.  He did not get any relief with physical therapy.  Recommend evaluation with orthopedics.

## 2024-04-25 NOTE — ASSESSMENT & PLAN NOTE
His chronic myofascial symptoms are generally well-controlled with Lyrica.  He is also taking Cymbalta.  These are prescribed by his PCP.  He uses Flexeril as needed at night which has helped him sleep.  Encouraged regular home exercise program.  Labs per PCP.  Plan to follow-up in 4 to 6 months or sooner if needed.

## 2024-04-25 NOTE — PROGRESS NOTES
Assessment/Plan:    Myofascial pain syndrome  His chronic myofascial symptoms are generally well-controlled with Lyrica.  He is also taking Cymbalta.  These are prescribed by his PCP.  He uses Flexeril as needed at night which has helped him sleep.  Encouraged regular home exercise program.  Labs per PCP.  Plan to follow-up in 4 to 6 months or sooner if needed.    Spondylosis of cervical region without myelopathy or radiculopathy  Ongoing neck pain with occasional radicular symptoms in his right arm.  He has tried physical therapy in the past however he found that this made his symptoms worse.  Recommend MRI of his neck.  Will likely refer to pain management after his MRI is done.    Right shoulder pain  Ongoing right shoulder pain.  He does have evidence of biceps tenosynovitis and subacromial/subdeltoid bursitis seen on an MRI from March 2023.  He did not get any relief with physical therapy.  Recommend evaluation with orthopedics.    Gastroesophageal reflux disease with esophagitis without hemorrhage  Symptoms are stable with Protonix.       Diagnoses and all orders for this visit:    Myofascial pain syndrome  -     cyclobenzaprine (FLEXERIL) 10 mg tablet; Take 1 tablet (10 mg total) by mouth daily at bedtime    Spondylosis of cervical region without myelopathy or radiculopathy  -     MRI cervical spine without contrast; Future    Chronic right shoulder pain    Subdeltoid bursitis of right shoulder joint  -     Ambulatory Referral to Orthopedic Surgery; Future    Gastroesophageal reflux disease with esophagitis without hemorrhage          Subjective:      Patient ID: Avni Aguirre is a 58 y.o. male.    He is here for follow-up of osteoarthritis and muscle pain.  He is a previous patient of Dr. Garcia.  He was initially evaluated in July 2023 for history of muscle cramping along with neck and lower back pain.  He had a workup for inflammatory myopathy including a biomarker panel which was negative.  Rheumatoid  factor, CCP negative as well.  CK within normal limits.    He is currently taking Lyrica for his muscle and myofascial pain.  He has a history of generalized osteoarthritis.  He is generally stiffness in the morning for approximately 15 minutes when he wakes up.  He has no swelling in his joints.  He is most bothered by pain in his right shoulder.  He also has pain in his neck that radiates up the back of his head causing headaches as well.  He has a history of right shoulder pain with biceps tenosynovitis and subacromial/subdeltoid bursitis seen on MRI from March 2023.  He has tried physical therapy for his shoulder and neck pain in the past however this did not provide any relief for him and he feels it made his symptoms worse.  He does note some paresthesias and numbness in his right arm.    He has a history of right carpal tunnel status post carpal tunnel release done in July 2023.    He has a history of GERD and follows with GI.  He takes Protonix for this and his symptoms are well-controlled.       The following portions of the patient's history were reviewed and updated as appropriate: allergies, current medications, past family history, past medical history, past social history, past surgical history, and problem list.    Review of Systems   Constitutional:  Negative for chills, fatigue and fever.   HENT:  Negative for hearing loss, sore throat and tinnitus.    Eyes:  Negative for pain and visual disturbance.   Respiratory:  Negative for cough and shortness of breath.    Cardiovascular:  Negative for chest pain and palpitations.   Gastrointestinal:  Negative for abdominal pain, nausea and vomiting.   Genitourinary:  Negative for difficulty urinating.   Musculoskeletal:  Positive for arthralgias, myalgias, neck pain and neck stiffness. Negative for back pain, gait problem and joint swelling.   Skin:  Negative for rash.   Neurological:  Positive for numbness (Right arm). Negative for dizziness, seizures,  "weakness and headaches.   Psychiatric/Behavioral:  Negative for confusion, decreased concentration and sleep disturbance.          Objective:      /82 (BP Location: Right arm, Patient Position: Sitting, Cuff Size: Adult)   Pulse 86   Temp 98.2 °F (36.8 °C) (Tympanic)   Ht 5' 9\" (1.753 m)   Wt 92.9 kg (204 lb 12.8 oz)   SpO2 94%   BMI 30.24 kg/m²          Physical Exam  Constitutional:       Appearance: Normal appearance.   Cardiovascular:      Rate and Rhythm: Normal rate and regular rhythm.   Pulmonary:      Breath sounds: Normal breath sounds.   Musculoskeletal:         General: No swelling or tenderness.      Comments: Decreased range of motion right shoulder; triggers posterior cervical and shoulder girdle muscles right greater than left   Skin:     General: Skin is warm and dry.   Neurological:      General: No focal deficit present.      Mental Status: He is alert.      Motor: No weakness.         Dragon Dictation software was used to dictate this note. It may contain errors with dictating incorrect words/spelling. Please contact provider directly for any questions.    "

## 2024-04-25 NOTE — ASSESSMENT & PLAN NOTE
Ongoing neck pain with occasional radicular symptoms in his right arm.  He has tried physical therapy in the past however he found that this made his symptoms worse.  Recommend MRI of his neck.  Will likely refer to pain management after his MRI is done.

## 2024-06-18 NOTE — TELEPHONE ENCOUNTER
How Severe Are Your Bumps?: mild
rec'd HNL coding errors paper    Scanning into media and sending to BT office
Have Your Bumps Been Treated?: not been treated
Is This A New Presentation, Or A Follow-Up?: Bumps

## (undated) DEVICE — PREMIUM DRY TRAY LF: Brand: MEDLINE INDUSTRIES, INC.

## (undated) DEVICE — PADDING CAST 4 IN  COTTON STRL

## (undated) DEVICE — GAUZE SPONGES,16 PLY: Brand: CURITY

## (undated) DEVICE — RETROGRADE KNIFE BOX OF 6: Brand: ECTRA

## (undated) DEVICE — ACE WRAP 4 IN STERILE

## (undated) DEVICE — OCCLUSIVE GAUZE STRIP,3% BISMUTH TRIBROMOPHENATE IN PETROLATUM BLEND: Brand: XEROFORM

## (undated) DEVICE — NEEDLE 25G X 1 1/2

## (undated) DEVICE — SUT PROLENE 4-0 PS-2 18 IN 8682G

## (undated) DEVICE — SPONGE PVP SCRUB WING STERILE

## (undated) DEVICE — DISPOSABLE EQUIPMENT COVER: Brand: SMALL TOWEL DRAPE

## (undated) DEVICE — GLOVE SRG BIOGEL 7.5

## (undated) DEVICE — GLOVE INDICATOR PI UNDERGLOVE SZ 8 BLUE

## (undated) DEVICE — STERILE BETHLEHEM PLASTIC HAND: Brand: CARDINAL HEALTH

## (undated) DEVICE — STRL COTTON TIP APPLCTR 6IN PK: Brand: CARDINAL HEALTH